# Patient Record
Sex: FEMALE | Race: WHITE | NOT HISPANIC OR LATINO | Employment: FULL TIME | URBAN - METROPOLITAN AREA
[De-identification: names, ages, dates, MRNs, and addresses within clinical notes are randomized per-mention and may not be internally consistent; named-entity substitution may affect disease eponyms.]

---

## 2017-10-30 ENCOUNTER — TELEPHONE (OUTPATIENT)
Dept: OBSTETRICS AND GYNECOLOGY | Facility: CLINIC | Age: 37
End: 2017-10-30

## 2017-10-30 ENCOUNTER — INITIAL PRENATAL (OUTPATIENT)
Dept: OBSTETRICS AND GYNECOLOGY | Facility: CLINIC | Age: 37
End: 2017-10-30

## 2017-10-30 VITALS
DIASTOLIC BLOOD PRESSURE: 62 MMHG | HEIGHT: 65 IN | SYSTOLIC BLOOD PRESSURE: 120 MMHG | BODY MASS INDEX: 31.32 KG/M2 | WEIGHT: 188 LBS

## 2017-10-30 DIAGNOSIS — O30.043 DICHORIONIC DIAMNIOTIC TWIN PREGNANCY IN THIRD TRIMESTER: Primary | ICD-10-CM

## 2017-10-30 DIAGNOSIS — O09.523 ELDERLY MULTIGRAVIDA IN THIRD TRIMESTER: ICD-10-CM

## 2017-10-30 PROCEDURE — 99204 OFFICE O/P NEW MOD 45 MIN: CPT | Performed by: OBSTETRICS & GYNECOLOGY

## 2017-10-30 RX ORDER — FERROUS SULFATE 325(65) MG
325 TABLET ORAL
Qty: 30 TABLET | Refills: 5 | Status: SHIPPED | OUTPATIENT
Start: 2017-10-30 | End: 2018-01-08

## 2017-10-30 RX ORDER — FERROUS SULFATE 325(65) MG
325 TABLET ORAL
COMMUNITY
End: 2017-11-13 | Stop reason: SDUPTHER

## 2017-10-30 NOTE — TELEPHONE ENCOUNTER
Dr Andre merino pt this morning, her iron and prenatal vitamin did not get sent to [facility] on [address]

## 2017-10-31 ENCOUNTER — TELEPHONE (OUTPATIENT)
Dept: OBSTETRICS AND GYNECOLOGY | Facility: CLINIC | Age: 37
End: 2017-10-31

## 2017-10-31 NOTE — TELEPHONE ENCOUNTER
Nurse from [facility] called - prescription were to be called to pharmacy iron was prenatal vitamins were not    Please call prenatal vitamins to [facility] [address]   She can not do over the counter - she needs some called in

## 2017-11-01 ENCOUNTER — TELEPHONE (OUTPATIENT)
Dept: OBSTETRICS AND GYNECOLOGY | Facility: CLINIC | Age: 37
End: 2017-11-01

## 2017-11-01 NOTE — TELEPHONE ENCOUNTER
Dr AMILCAR Nolasco is calling for prenatal vitamins to please be called in, she has been out for a week. She put a message in Monday and yesterday.

## 2017-11-02 ENCOUNTER — TELEPHONE (OUTPATIENT)
Dept: OBSTETRICS AND GYNECOLOGY | Facility: CLINIC | Age: 37
End: 2017-11-02

## 2017-11-02 NOTE — TELEPHONE ENCOUNTER
Dr. Powell patient 28w6d  [phone] Pharmacy is needing clarification on prenatal vitamin. Pharmacist states patient's insurance will not cover a gummy but they will cover a chewable and wants to know if it's OK for them to change Rx from a gummy to a chewable. I gave them the OK to change from a gummy to a chewable.

## 2017-11-03 ENCOUNTER — APPOINTMENT (OUTPATIENT)
Dept: WOMENS IMAGING | Facility: HOSPITAL | Age: 37
End: 2017-11-03

## 2017-11-03 ENCOUNTER — HOSPITAL ENCOUNTER (OUTPATIENT)
Facility: HOSPITAL | Age: 37
Setting detail: OBSERVATION
Discharge: HOME | End: 2017-11-03
Attending: OBSTETRICS & GYNECOLOGY | Admitting: OBSTETRICS & GYNECOLOGY

## 2017-11-03 VITALS
SYSTOLIC BLOOD PRESSURE: 123 MMHG | DIASTOLIC BLOOD PRESSURE: 67 MMHG | BODY MASS INDEX: 29.09 KG/M2 | WEIGHT: 181 LBS | TEMPERATURE: 98 F | HEIGHT: 66 IN | HEART RATE: 94 BPM | RESPIRATION RATE: 18 BRPM

## 2017-11-03 PROBLEM — O46.93 PREGNANCY WITH THIRD TRIMESTER BLEEDING: Status: ACTIVE | Noted: 2017-11-03

## 2017-11-03 LAB
ABO GROUP BLD: NORMAL
ABO GROUP BLD: NORMAL
BACTERIA UR QL AUTO: ABNORMAL /HPF
BILIRUB UR QL STRIP: NEGATIVE
BLD GP AB SCN SERPL QL: NEGATIVE
CLARITY UR: ABNORMAL
COLOR UR: YELLOW
DEPRECATED RDW RBC AUTO: 44.6 FL (ref 37–54)
ERYTHROCYTE [DISTWIDTH] IN BLOOD BY AUTOMATED COUNT: 12.9 % (ref 11.3–14.5)
GLUCOSE UR STRIP-MCNC: NEGATIVE MG/DL
HCT VFR BLD AUTO: 30 % (ref 34.5–44)
HGB BLD-MCNC: 9.9 G/DL (ref 11.5–15.5)
HGB UR QL STRIP.AUTO: NEGATIVE
HYALINE CASTS UR QL AUTO: ABNORMAL /LPF
KETONES UR QL STRIP: NEGATIVE
LEUKOCYTE ESTERASE UR QL STRIP.AUTO: NEGATIVE
MCH RBC QN AUTO: 31.5 PG (ref 27–31)
MCHC RBC AUTO-ENTMCNC: 33 G/DL (ref 32–36)
MCV RBC AUTO: 95.5 FL (ref 80–99)
NITRITE UR QL STRIP: NEGATIVE
PH UR STRIP.AUTO: 7 [PH] (ref 5–8)
PLATELET # BLD AUTO: 204 10*3/MM3 (ref 150–450)
PMV BLD AUTO: 10.5 FL (ref 6–12)
PROT UR QL STRIP: NEGATIVE
RBC # BLD AUTO: 3.14 10*6/MM3 (ref 3.89–5.14)
RBC # UR: ABNORMAL /HPF
REF LAB TEST METHOD: ABNORMAL
RH BLD: POSITIVE
RH BLD: POSITIVE
SP GR UR STRIP: 1.02 (ref 1–1.03)
SQUAMOUS #/AREA URNS HPF: ABNORMAL /HPF
UROBILINOGEN UR QL STRIP: ABNORMAL
WBC NRBC COR # BLD: 11.22 10*3/MM3 (ref 3.5–10.8)
WBC UR QL AUTO: ABNORMAL /HPF

## 2017-11-03 PROCEDURE — 76818 FETAL BIOPHYS PROFILE W/NST: CPT

## 2017-11-03 PROCEDURE — 76811 OB US DETAILED SNGL FETUS: CPT

## 2017-11-03 PROCEDURE — 86900 BLOOD TYPING SEROLOGIC ABO: CPT

## 2017-11-03 PROCEDURE — 87350 HEPATITIS BE AG IA: CPT | Performed by: OBSTETRICS & GYNECOLOGY

## 2017-11-03 PROCEDURE — 80074 ACUTE HEPATITIS PANEL: CPT | Performed by: OBSTETRICS & GYNECOLOGY

## 2017-11-03 PROCEDURE — 85027 COMPLETE CBC AUTOMATED: CPT | Performed by: OBSTETRICS & GYNECOLOGY

## 2017-11-03 PROCEDURE — 81001 URINALYSIS AUTO W/SCOPE: CPT | Performed by: OBSTETRICS & GYNECOLOGY

## 2017-11-03 PROCEDURE — 86850 RBC ANTIBODY SCREEN: CPT | Performed by: OBSTETRICS & GYNECOLOGY

## 2017-11-03 PROCEDURE — 86704 HEP B CORE ANTIBODY TOTAL: CPT | Performed by: OBSTETRICS & GYNECOLOGY

## 2017-11-03 PROCEDURE — 86901 BLOOD TYPING SEROLOGIC RH(D): CPT

## 2017-11-03 PROCEDURE — 86706 HEP B SURFACE ANTIBODY: CPT | Performed by: OBSTETRICS & GYNECOLOGY

## 2017-11-03 PROCEDURE — 99219 PR INITIAL OBSERVATION CARE/DAY 50 MINUTES: CPT | Performed by: OBSTETRICS & GYNECOLOGY

## 2017-11-03 PROCEDURE — 76812 OB US DETAILED ADDL FETUS: CPT | Performed by: OBSTETRICS & GYNECOLOGY

## 2017-11-03 PROCEDURE — 86707 HEPATITIS BE ANTIBODY: CPT | Performed by: OBSTETRICS & GYNECOLOGY

## 2017-11-03 PROCEDURE — 86901 BLOOD TYPING SEROLOGIC RH(D): CPT | Performed by: OBSTETRICS & GYNECOLOGY

## 2017-11-03 PROCEDURE — G0378 HOSPITAL OBSERVATION PER HR: HCPCS

## 2017-11-03 PROCEDURE — 59025 FETAL NON-STRESS TEST: CPT | Performed by: OBSTETRICS & GYNECOLOGY

## 2017-11-03 PROCEDURE — 76812 OB US DETAILED ADDL FETUS: CPT

## 2017-11-03 PROCEDURE — 76811 OB US DETAILED SNGL FETUS: CPT | Performed by: OBSTETRICS & GYNECOLOGY

## 2017-11-03 PROCEDURE — 86900 BLOOD TYPING SEROLOGIC ABO: CPT | Performed by: OBSTETRICS & GYNECOLOGY

## 2017-11-03 RX ORDER — ACETAMINOPHEN 325 MG/1
650 TABLET ORAL ONCE
Status: COMPLETED | OUTPATIENT
Start: 2017-11-03 | End: 2017-11-03

## 2017-11-03 RX ADMIN — ACETAMINOPHEN 650 MG: 325 TABLET ORAL at 05:32

## 2017-11-03 NOTE — NURSING NOTE
D/c teaching reviewed with patient. All questions are answered. Pt is able to v/u. D/c'd to home in good condition

## 2017-11-03 NOTE — H&P
"Viviane Brambila  1980  [id number]  [id number]    CC: bleeding  HPI:  Patient is 36 y.o. white female   currently at 29w0d  Presents with c/o waking up at 0130 with bright red blood in underwear and on bed.  No preceding pain.  Since then pt has only seen pink-tinged d/c with wiping.  Pt does c/o pelvic pressure, constant, non-radiating.  Pt denies recent intercourse.  PNC comp by di/di twins, tob use.  Pt is residing at [facility] from [place].     PMH:   Current meds PNV, Fe, Zantac 150mg prn  Illnesses none  Surgeries breast aug, oral surg  Allergies NKDA    Past OB History:       Obstetric History       T4      L4     SAB0   TAB0   Ectopic0   Multiple0   Live Births4       # Outcome Date GA Lbr Sami/2nd Weight Sex Delivery Anes PTL Lv   6 Current            5 Term 16   7 lb 3 oz (3.26 kg) F Vag-Spont   LYNDON      Name: Rai   4 Term 09   7 lb (3.175 kg) M Vag-Spont   LYNDON      Name: Arvind   3 Term 03   8 lb 6 oz (3.799 kg) F Vag-Spont   LYNDON      Name: Janiya   2 Term 01   7 lb 5 oz (3.317 kg) F Vag-Spont   LYNDON      Name: Adriane   1 AB                        SH: tob 1/2 PPD , EtOH neg, drugs prior hx of xanax use and IV meth use  FH: heart dz neg , diabetes neg , cancer pos    General ROS: All systems reviewed and negative       Physical Examination: General appearance - alert, well appearing, and in no distress  Vital signs - /74 (BP Location: Right arm, Patient Position: Lying)  Pulse 96  Temp 98 °F (36.7 °C) (Oral)   Resp 16  Ht 66\" (167.6 cm)  Wt 181 lb (82.1 kg)  LMP 2017 (Approximate)  BMI 29.21 kg/m2  HEENT: normocephalic, atraumatic, pharynx clear   Neck - supple, no significant adenopathy  Lymphatics - no palpable lymphadenopathy, no hepatosplenomegaly  Chest - clear to auscultation, no wheezes, rales or rhonchi, symmetric air entry  Heart - normal rate, regular rhythm, normal S1, S2, no murmurs, rubs, clicks or gallops, no " JVD  Abdomen - soft, nontender, nondistended, no masses or organomegaly  no rebound tenderness noted, bowel sounds normal  Vaginal Exam: 1-2/th/ballot, no blood or blood-tinged d/c in vagina  Extremities - no pedal edema noted, no calf tend  Skin - normal coloration and turgor, no rashes, no suspicious skin lesions noted        Fetal monitoring: indication vaginal bleeding , onset 0412 , offset 0450 , baseline 145(A) 140(B) , mod BTB variability(A and B) , multiple accels (10 X 10)(A and B), no decels(A or B), occas contractions, interpretation reactive NST (A and B)    Radiology US:vertex, vertex, both fetuses active with normal fluid, both placentas show no evid retroplacental clot    Assessment 1)IUP 29 0/7 weeks     2)di/di twins      3)bleeding- ?source, doubt vaginal, doubt BLAKE with rare contractions and 4 prior vaginal deliveries    4)tobacco abuse      5)hx substance use    Plan 1)observe      2)cath UA      3)PDC scan in am    4)labs    Geo Green MD  11/3/2017  4:45 AM

## 2017-11-03 NOTE — POST-PROCEDURE NOTE
MFM ultrasound    Twin A    Breech  Size=Dates = 2lb 14oz  BRUCE normal  S/D normal  BPP 8/8    Twin B  Vertex  Size=Dates = 3lb 6oz  BRUCE normal   S/D normal  BPP 8/8      No previa, abruption or subchorionic hematoma seen.  Cervix 3.7 cm

## 2017-11-06 LAB
HBV CORE AB SER DONR QL IA: NEGATIVE
HBV CORE IGM SERPL QL IA: NEGATIVE
HBV E AB SERPL QL IA: NEGATIVE
HBV E AG SERPL QL IA: NEGATIVE
HBV SURFACE AB SER QL: REACTIVE
HBV SURFACE AG SERPL QL IA: NEGATIVE
HCV AB S/CO SERPL IA: >11 S/CO RATIO (ref 0–0.9)
LABORATORY COMMENT REPORT: NORMAL

## 2017-11-13 ENCOUNTER — ROUTINE PRENATAL (OUTPATIENT)
Dept: OBSTETRICS AND GYNECOLOGY | Facility: CLINIC | Age: 37
End: 2017-11-13

## 2017-11-13 VITALS — BODY MASS INDEX: 32.18 KG/M2 | DIASTOLIC BLOOD PRESSURE: 66 MMHG | SYSTOLIC BLOOD PRESSURE: 134 MMHG | WEIGHT: 199.4 LBS

## 2017-11-13 DIAGNOSIS — O09.523 AMA (ADVANCED MATERNAL AGE) MULTIGRAVIDA 35+, THIRD TRIMESTER: ICD-10-CM

## 2017-11-13 DIAGNOSIS — O30.041 DICHORIONIC DIAMNIOTIC TWIN PREGNANCY IN FIRST TRIMESTER: Primary | ICD-10-CM

## 2017-11-13 DIAGNOSIS — F19.11 HISTORY OF SUBSTANCE ABUSE (HCC): ICD-10-CM

## 2017-11-13 PROCEDURE — 99213 OFFICE O/P EST LOW 20 MIN: CPT | Performed by: OBSTETRICS & GYNECOLOGY

## 2017-11-13 RX ORDER — PROMETHAZINE HYDROCHLORIDE 25 MG/1
TABLET ORAL
Refills: 0 | COMMUNITY
Start: 2017-11-07 | End: 2017-11-22 | Stop reason: HOSPADM

## 2017-11-13 RX ORDER — POLYETHYLENE GLYCOL 3350 17 G/17G
POWDER, FOR SOLUTION ORAL
Refills: 0 | COMMUNITY
Start: 2017-11-06 | End: 2018-01-08

## 2017-11-13 RX ORDER — VITAMINS AND MINERALS 1; 1000; 100; 400; 30; 3; 3; 15; 20; 12; 7; 200; 29; 20 MG/1; [IU]/1; MG/1; [IU]/1; [IU]/1; MG/1; MG/1; MG/1; MG/1; UG/1; MG/1; MG/1; MG/1; MG/1
TABLET, CHEWABLE ORAL
COMMUNITY
Start: 2017-11-02 | End: 2017-12-12 | Stop reason: SDUPTHER

## 2017-11-13 NOTE — PROGRESS NOTES
Chief Complaint   Patient presents with   • Routine Prenatal Visit     Pelvic pressure       HPI: Viviane is a  currently at 30w3d who today reports the following:  Contractions - No; Leaking - No; Vaginal bleeding -  No; Swelling of extremities - No.    ROS:  GI: Nausea - No; Constipation - No; Diarrhea - No    Neuro: Headache - No; Visual change - No      EXAM:  Vitals: See prenatal flowsheet   Abdomen: See prenatal flowsheet   Urine glucose/protein: See prenatal flowsheet   Pelvic: See prenatal flowsheet   MDM:  Impression: 1. Supervision of low risk pregnancy  2. H/O prior narcotics abuse - in remission on maintenance  3. Twin pregnancy - DC/DA  4. AMA   Tests done today: 1. none   Topics discussed: 1. Continue with PNV's  2. Prenatal labs reviewed  3. none - she had no major complaints,questions or concerns   Tests next visit: U/S for EFW   Next visit: See prenatal flowsheet

## 2017-11-21 ENCOUNTER — TELEPHONE (OUTPATIENT)
Dept: OBSTETRICS AND GYNECOLOGY | Facility: CLINIC | Age: 37
End: 2017-11-21

## 2017-11-22 ENCOUNTER — HOSPITAL ENCOUNTER (OUTPATIENT)
Facility: HOSPITAL | Age: 37
Discharge: HOME | End: 2017-11-22
Attending: OBSTETRICS & GYNECOLOGY | Admitting: OBSTETRICS & GYNECOLOGY

## 2017-11-22 ENCOUNTER — TELEPHONE (OUTPATIENT)
Dept: OBSTETRICS AND GYNECOLOGY | Facility: CLINIC | Age: 37
End: 2017-11-22

## 2017-11-22 VITALS
SYSTOLIC BLOOD PRESSURE: 120 MMHG | DIASTOLIC BLOOD PRESSURE: 63 MMHG | TEMPERATURE: 98.4 F | HEART RATE: 107 BPM | RESPIRATION RATE: 22 BRPM | BODY MASS INDEX: 32.78 KG/M2 | WEIGHT: 204 LBS | HEIGHT: 66 IN

## 2017-11-22 PROCEDURE — G0463 HOSPITAL OUTPT CLINIC VISIT: HCPCS

## 2017-11-22 PROCEDURE — 59025 FETAL NON-STRESS TEST: CPT

## 2017-11-22 PROCEDURE — 99225 PR SBSQ OBSERVATION CARE/DAY 25 MINUTES: CPT | Performed by: OBSTETRICS & GYNECOLOGY

## 2017-11-22 RX ORDER — HYDROXYZINE PAMOATE 25 MG/1
CAPSULE ORAL
Qty: 30 CAPSULE | Refills: 1 | Status: SHIPPED | OUTPATIENT
Start: 2017-11-22 | End: 2018-01-08

## 2017-11-22 RX ORDER — RANITIDINE 150 MG/1
150 TABLET ORAL NIGHTLY
Qty: 30 TABLET | Refills: 2 | Status: SHIPPED | OUTPATIENT
Start: 2017-11-22 | End: 2018-01-08

## 2017-11-22 RX ORDER — RANITIDINE 150 MG/1
150 TABLET ORAL NIGHTLY
Qty: 30 TABLET | Refills: 1 | Status: ON HOLD | OUTPATIENT
Start: 2017-11-22 | End: 2017-11-22

## 2017-11-22 NOTE — NURSING NOTE
1707--DISCHARGE TEACHING DONE, INSTRUCTED TO KEEP SCHEDULED APPOINTMENT, RX SENT TO PT'S PHARMACY, V/U  2729--DISCHARGED VIA AMBULATION TO CAR ACCOMPANIED BY FRIEND

## 2017-11-22 NOTE — TELEPHONE ENCOUNTER
Dr Powell Pt is calling stating she is having a lot pain between her legs and pressure. She believes she has lost her mucus plug last week.   Call her at this [phone]

## 2017-11-22 NOTE — PROGRESS NOTES
Atiya Brambila  : 1980  MRN: [id number]  CSN: [id number]    Antepartum Progress Note      Subjective     Patient complained of a sharp pelvic pain in the tailbone radiating into the vagina.  Worse when sitting or laying for extended periods time.  Normal fetal movements, negative contractions, leakage of fluid and vaginal bleeding.    Objective     Min/max vitals past 24 hours:   Temp  Min: 98.4 °F (36.9 °C)  Max: 98.4 °F (36.9 °C)  BP  Min: 120/63  Max: 120/63  Pulse  Min: 107  Max: 107  Resp  Min: 22  Max: 22         General: well developed; well nourished  no acute distress   Heart: regular rate and rhythm, S1, S2 normal, no murmur, click, rub or gallop   Lungs: breathing is unlabored   Abdomen: soft, non-tender; no masses  no umbilical or inginual hernias are present  no hepato-splenomegaly  fundus firm and non-tender   FHT's: reassuring and Appropriate for gestational age ×2   Cervix: was checked (by RN): 1 cm / 10 % / Floating   Contractions: irregular   Back: bilateral CVA tenderness is absent           Assessment     1. IUP at 31w5d  2. Pelvic pain       Plan     1. Explain to patient that pain is most likely due to softening of the joints in the sacrum.  Patient reassured.  Will discharge home.    Miguelito Powell MD  2017  4:51 PM

## 2017-11-28 ENCOUNTER — ROUTINE PRENATAL (OUTPATIENT)
Dept: OBSTETRICS AND GYNECOLOGY | Facility: CLINIC | Age: 37
End: 2017-11-28

## 2017-11-28 VITALS — BODY MASS INDEX: 33.41 KG/M2 | WEIGHT: 207 LBS | DIASTOLIC BLOOD PRESSURE: 68 MMHG | SYSTOLIC BLOOD PRESSURE: 120 MMHG

## 2017-11-28 DIAGNOSIS — O30.043 DICHORIONIC DIAMNIOTIC TWIN PREGNANCY IN THIRD TRIMESTER: Primary | ICD-10-CM

## 2017-11-28 PROCEDURE — 99213 OFFICE O/P EST LOW 20 MIN: CPT | Performed by: OBSTETRICS & GYNECOLOGY

## 2017-11-28 NOTE — PROGRESS NOTES
Chief Complaint   Patient presents with   • Routine Prenatal Visit     No complaints       HPI: Viviane is a  currently at 32w4d who today reports the following:  Contractions - No; Leaking - No; Vaginal bleeding -  No; Swelling of extremities - No.    ROS:  GI: Nausea - No; Constipation - No; Diarrhea - No    Neuro: Headache - No; Visual change - No      EXAM:  Vitals: See prenatal flowsheet   Abdomen: See prenatal flowsheet   Urine glucose/protein: See prenatal flowsheet   Pelvic: See prenatal flowsheet   MDM:  Impression: 1. Supervision of low risk pregnancy  2. Twin pregnancy - DC/DA   Tests done today: 1. U/S for EFW - Report pending   Topics discussed: 1. Continue with PNV's  2. Prenatal labs reviewed  3. none - she had no major complaints,questions or concerns   Tests next visit: none   Next visit: See prenatal flowsheet

## 2017-12-07 ENCOUNTER — HOSPITAL ENCOUNTER (OUTPATIENT)
Facility: HOSPITAL | Age: 37
Setting detail: OBSERVATION
Discharge: HOME | End: 2017-12-07
Attending: OBSTETRICS & GYNECOLOGY | Admitting: OBSTETRICS & GYNECOLOGY

## 2017-12-07 ENCOUNTER — TELEPHONE (OUTPATIENT)
Dept: OBSTETRICS AND GYNECOLOGY | Facility: CLINIC | Age: 37
End: 2017-12-07

## 2017-12-07 VITALS
HEIGHT: 65 IN | SYSTOLIC BLOOD PRESSURE: 129 MMHG | DIASTOLIC BLOOD PRESSURE: 70 MMHG | RESPIRATION RATE: 16 BRPM | BODY MASS INDEX: 34.32 KG/M2 | TEMPERATURE: 97.8 F | HEART RATE: 101 BPM | WEIGHT: 206 LBS

## 2017-12-07 PROBLEM — Z34.90 PREGNANCY: Status: ACTIVE | Noted: 2017-12-07

## 2017-12-07 LAB
BACTERIA UR QL AUTO: ABNORMAL /HPF
BILIRUB UR QL STRIP: NEGATIVE
CLARITY UR: ABNORMAL
COLOR UR: YELLOW
GLUCOSE UR STRIP-MCNC: NEGATIVE MG/DL
HGB UR QL STRIP.AUTO: NEGATIVE
HYALINE CASTS UR QL AUTO: ABNORMAL /LPF
KETONES UR QL STRIP: NEGATIVE
LEUKOCYTE ESTERASE UR QL STRIP.AUTO: NEGATIVE
NITRITE UR QL STRIP: NEGATIVE
PH UR STRIP.AUTO: 7 [PH] (ref 5–8)
PROT UR QL STRIP: ABNORMAL
RBC # UR: ABNORMAL /HPF
REF LAB TEST METHOD: ABNORMAL
SP GR UR STRIP: 1.02 (ref 1–1.03)
SQUAMOUS #/AREA URNS HPF: ABNORMAL /HPF
UROBILINOGEN UR QL STRIP: ABNORMAL
WBC UR QL AUTO: ABNORMAL /HPF

## 2017-12-07 PROCEDURE — 99218 PR INITIAL OBSERVATION CARE/DAY 30 MINUTES: CPT | Performed by: OBSTETRICS & GYNECOLOGY

## 2017-12-07 PROCEDURE — G0378 HOSPITAL OBSERVATION PER HR: HCPCS

## 2017-12-07 PROCEDURE — 81001 URINALYSIS AUTO W/SCOPE: CPT | Performed by: OBSTETRICS & GYNECOLOGY

## 2017-12-07 PROCEDURE — 59025 FETAL NON-STRESS TEST: CPT

## 2017-12-07 PROCEDURE — 87086 URINE CULTURE/COLONY COUNT: CPT | Performed by: OBSTETRICS & GYNECOLOGY

## 2017-12-07 NOTE — PROGRESS NOTES
[facility]  Obstetric History and Physical    Chief Complaint   Patient presents with   • Abdominal Pain     Patient states she felt a gush of fluid 17 and is having left sided pain that radiates to her back       Subjective     Patient is a 37 y.o. female  currently at 33w6d, who presents with C/O rupture membranes.  Plan possible leaking of fluid last evening without associated vaginal bleeding or regular contractions.  Patient reports normal fetal activity with both fetuses will care, K by advanced maternal age DI/DI  twin gestation.    Her prenatal care is complicated by  .  Her previous obstetric/gynecological history is noted for is remarkable for .    The following portions of the patients history were reviewed and updated as appropriate: current medications, allergies, past medical history, past surgical history, past family history, past social history and problem list .       Prenatal Information:   Maternal Prenatal Labs  Blood Type No results found for: ABO   Rh Status No results found for: RH   Antibody Screen No results found for: ABSCRN   Gonnorhea No results found for: GCCX   Chlamydia No results found for: CLAMYDCU   RPR No results found for: RPR   Syphilis Antibody No results found for: SYPHILIS   Rubella No results found for: RUBELLAIGGIN   Hepatitis B Surface Antigen No results found for: HEPBSAG   HIV-1 Antibody No results found for: LABHIV1   Hepatitis C Antibody No results found for: HEPCAB   Rapid Urin Drug Screen No results found for: AMPMETHU, BARBITSCNUR, LABBENZSCN, LABMETHSCN, LABOPIASCN, THCURSCR, COCAINEUR, AMPHETSCREEN, PROPOXSCN, BUPRENORSCNU, METAMPSCNUR, OXYCODONESCN, TRICYCLICSCN   Group B Strep Culture No results found for: GBSANTIGEN                 Past OB History:       Obstetric History       T4      L4     SAB0   TAB0   Ectopic0   Multiple0   Live Births4       # Outcome Date GA Lbr Sami/2nd Weight Sex Delivery Anes PTL Lv   6 Current            5  Term 16   3260 g (7 lb 3 oz) F Vag-Spont   LYNDON      Name: Rai   4 Term 09   3175 g (7 lb) M Vag-Spont   LYNDON      Name: Arvind   3 Term 03   3799 g (8 lb 6 oz) F Vag-Spont   LYNDON      Name: Janiya   2 Term 01   3317 g (7 lb 5 oz) F Vag-Spont   LYNDON      Name: Adriane   1 AB                   Past Medical History: Past Medical History:   Diagnosis Date   •  in first trimester 2012    ELECTIVE   • TIA (transient ischemic attack)       Past Surgical History Past Surgical History:   Procedure Laterality Date   • BREAST AUGMENTATION Bilateral    • INDUCED   2012    ELECTIVE   • WISDOM TOOTH EXTRACTION        Family History: Family History   Problem Relation Age of Onset   • Colon cancer Maternal Grandmother       Social History:  reports that she has been smoking Cigarettes.  She has been smoking about 0.50 packs per day. She has never used smokeless tobacco.   reports that she does not drink alcohol.   reports that she uses illicit drugs, including Methamphetamines and Benzodiazepines.                   General ROS Negative Findings:Headaches, Visual Changes, Epigastric pain, Anorexia, Nausia/Vomiting and Vaginal Bleeding    ROS      Objective       Vital Signs Range for the last 24 hours  Temperature: Temp:  [97.8 °F (36.6 °C)] 97.8 °F (36.6 °C)   Temp Source: Temp src: Oral   BP: BP: (129)/(70) 129/70   Pulse: Heart Rate:  [101] 101   Respirations: Resp:  [16] 16   SPO2:     O2 Amount (l/min):     O2 Devices     Weight: Weight:  [93.4 kg (206 lb)] 93.4 kg (206 lb)     Physical Examination:   General:   alert, appears stated age and cooperative   Skin:   normal   HEENT:     Lungs:   clear to auscultation bilaterally   Heart:   regular rate and rhythm, S1, S2 normal, no murmur, click, rub or gallop   Abdomen:  soft, gravid uterus non tender guarding, no rebound, negative CVA tenderness.     Lower Extremeties No edema, no calf tennis, DTRs 2+ over 4 no clonus    Pelvis:  External  genitalia: normal general appearance  Uterus: enlarged     Sterile speculum exam-no pooling, negative ferning, cervix is closed no evidence of blood.    Presentation:    Cervix: Exam by: Method: sterile exam per physician   Dilation: Dilation: 0   Effacement: Cervical Effacement: 60%   Station: Station: -2       Fetal Heart Rate Assessment   Method:     Beats/min:     Baseline:     Varibility:     Accels:     Decels:     Tracing Category:       Uterine Assessment   Method:     Frequency (min):     Ctx Count in 10 min:     Duration:     Intensity:     Intensity by IUPC:     Resting Tone:     Resting Tone by IUPC:     [place] Units:       Laboratory Results: @srugudgv45@  Radiology Review:@lastrad@  Other Studies:    Assessment/Plan     Active Problems:    Dichorionic diamniotic twin pregnancy in third trimester    Pregnancy        Assessment:  1.  Intrauterine pregnancy at 33w6d  Twin weeks gestation with reactive fetal status.    2. No evidence of rupture or labor .  3.   4.      Plan:  1. D/C to home, F/U OB routine or prn, PTL instructions.  2. Plan of care has been reviewed with patient.  3.  Risks, benefits of treatment plan have been discussed.  4.  All questions have been answered.  5      Andre Denney,   12/7/2017  12:31 PM

## 2017-12-07 NOTE — NURSING NOTE
Pt in stable condition, voices no complaints at this time. Discharge instructions reviewed with pt JANY. Pt has no questions at this time. DC'D home via private vehicle with a friend.

## 2017-12-07 NOTE — DISCHARGE INSTR - LAB
Keep your next scheduled appointment on Dec 12th with Dr Powell. If you have any concerns before your appointment please notify your provider.

## 2017-12-07 NOTE — TELEPHONE ENCOUNTER
Dr Powell Patient says her water broke yesterday and since then she is having pain and her belly feels tight.Please call this # [phone] she is out of minutes on her phone. Pregnant with twins.

## 2017-12-08 ENCOUNTER — TELEPHONE (OUTPATIENT)
Dept: OBSTETRICS AND GYNECOLOGY | Facility: CLINIC | Age: 37
End: 2017-12-08

## 2017-12-08 NOTE — TELEPHONE ENCOUNTER
Dr Powell Patient is stating she is in pain more then she can stand, she can't walk, she is 3 centimeters. Was in the labor and delivery yesterday.  Would like her Dr to please call her. [phone], wants you to please call her on this number.

## 2017-12-08 NOTE — TELEPHONE ENCOUNTER
I spoke with Dr. Powell, who states that the patient is carrying 2 large babies, and she probably is going to be uncomfortable until she delivers.  She was advised to rest as much as she can.  She is living at the [facility], and if she doesn't deliver by next Tuesday, she will have an appointment with Dr. Powell and will ask for a letter that will allow her to rest more/be on light duty.

## 2017-12-08 NOTE — TELEPHONE ENCOUNTER
"I called Viviane, who is weeks pregnant with twins.  She states that she \"feels like she is dying\" and \"has never been in this much pain\".  Having extreme pressure on \"pelvic bone and tail bone\".  States that she is having some contractions, but they are 20 minutes apart.  Unable to get comfortable, \"unable to rest\".  No abnormal discharge.  She would like for Dr. Powell to call her at the number listed in previous note.   "

## 2017-12-09 LAB
BACTERIA SPEC AEROBE CULT: NORMAL
BACTERIA SPEC AEROBE CULT: NORMAL

## 2017-12-12 ENCOUNTER — HOSPITAL ENCOUNTER (OUTPATIENT)
Facility: HOSPITAL | Age: 37
Discharge: HOME | End: 2017-12-12
Attending: OBSTETRICS & GYNECOLOGY | Admitting: OBSTETRICS & GYNECOLOGY

## 2017-12-12 ENCOUNTER — ROUTINE PRENATAL (OUTPATIENT)
Dept: OBSTETRICS AND GYNECOLOGY | Facility: CLINIC | Age: 37
End: 2017-12-12

## 2017-12-12 VITALS
HEART RATE: 110 BPM | DIASTOLIC BLOOD PRESSURE: 59 MMHG | TEMPERATURE: 98.4 F | SYSTOLIC BLOOD PRESSURE: 125 MMHG | RESPIRATION RATE: 18 BRPM

## 2017-12-12 VITALS — BODY MASS INDEX: 35.15 KG/M2 | SYSTOLIC BLOOD PRESSURE: 110 MMHG | DIASTOLIC BLOOD PRESSURE: 68 MMHG | WEIGHT: 211.2 LBS

## 2017-12-12 DIAGNOSIS — O30.043 DICHORIONIC DIAMNIOTIC TWIN PREGNANCY IN THIRD TRIMESTER: Primary | ICD-10-CM

## 2017-12-12 PROCEDURE — G0463 HOSPITAL OUTPT CLINIC VISIT: HCPCS

## 2017-12-12 PROCEDURE — 99213 OFFICE O/P EST LOW 20 MIN: CPT | Performed by: OBSTETRICS & GYNECOLOGY

## 2017-12-12 RX ORDER — LIDOCAINE HYDROCHLORIDE 10 MG/ML
5 INJECTION, SOLUTION INFILTRATION; PERINEURAL AS NEEDED
Status: CANCELLED | OUTPATIENT
Start: 2017-12-12

## 2017-12-12 RX ORDER — MISOPROSTOL 100 UG/1
800 TABLET ORAL AS NEEDED
Status: CANCELLED | OUTPATIENT
Start: 2017-12-12

## 2017-12-12 RX ORDER — VITAMIN A ACETATE, .BETA.-CAROTENE, ASCORBIC ACID, CHOLECALCIFEROL, .ALPHA.-TOCOPHEROL ACETATE, DL-, THIAMINE MONONITRATE, RIBOFLAVIN, NIACINAMIDE, PYRIDOXINE HYDROCHLORIDE, FOLIC ACID, CYANOCOBALAMIN, CALCIUM CARBONATE, FERROUS FUMARATE, ZINC OXIDE, AND CUPRIC OXIDE 2000; 2000; 120; 400; 22; 1.84; 3; 20; 10; 1; 12; 200; 27; 25; 2 [IU]/1; [IU]/1; MG/1; [IU]/1; MG/1; MG/1; MG/1; MG/1; MG/1; MG/1; UG/1; MG/1; MG/1; MG/1; MG/1
TABLET ORAL
Refills: 1 | COMMUNITY
Start: 2017-12-06 | End: 2019-05-10

## 2017-12-12 RX ORDER — ACETAMINOPHEN 325 MG/1
650 TABLET ORAL EVERY 4 HOURS PRN
Status: CANCELLED | OUTPATIENT
Start: 2017-12-12

## 2017-12-12 RX ORDER — METHYLERGONOVINE MALEATE 0.2 MG/ML
200 INJECTION INTRAVENOUS ONCE AS NEEDED
Status: CANCELLED | OUTPATIENT
Start: 2017-12-12

## 2017-12-12 RX ORDER — ACETAMINOPHEN 500 MG
1000 TABLET ORAL EVERY 6 HOURS PRN
COMMUNITY
End: 2017-12-22 | Stop reason: HOSPADM

## 2017-12-12 RX ORDER — SODIUM CHLORIDE 0.9 % (FLUSH) 0.9 %
1-10 SYRINGE (ML) INJECTION AS NEEDED
Status: CANCELLED | OUTPATIENT
Start: 2017-12-12

## 2017-12-12 RX ORDER — CARBOPROST TROMETHAMINE 250 UG/ML
250 INJECTION, SOLUTION INTRAMUSCULAR AS NEEDED
Status: CANCELLED | OUTPATIENT
Start: 2017-12-12

## 2017-12-12 RX ADMIN — HYDROCODONE POLISTIREX AND CHLORPHENIRAMINE POLISTIREX 5 ML: 10; 8 SUSPENSION, EXTENDED RELEASE ORAL at 14:01

## 2017-12-12 NOTE — PROGRESS NOTES
Inadvertently admitted for induction of labor.  Discussed mistake with patient and patient voiced her understanding. Will be sent home with labor instructions.

## 2017-12-12 NOTE — PROGRESS NOTES
Chief Complaint   Patient presents with   • Routine Prenatal Visit     Sore throat       HPI: Viviane is a  currently at 34w4d who today reports the following:  Contractions - No; Leaking - No; Vaginal bleeding -  No; Swelling of extremities - No.    ROS:  GI: Nausea - No; Constipation - No; Diarrhea - No    Neuro: Headache - No; Visual change - No      EXAM:  Vitals: See prenatal flowsheet   Abdomen: See prenatal flowsheet   Urine glucose/protein: See prenatal flowsheet   Pelvic: See prenatal flowsheet   MDM:  Impression: 1. Supervision of low risk pregnancy   Tests done today: 1. none   Topics discussed: 1. Continue with PNV's  2. Prenatal labs reviewed  3. none - she had no major complaints,questions or concerns   Tests next visit: none   Next visit: See prenatal flowsheet

## 2017-12-12 NOTE — DISCHARGE INSTRUCTIONS
Pt received Tussenex at 1401on 12/12/17.  Any questions, please call Labor and Delivery [phone]

## 2017-12-17 ENCOUNTER — APPOINTMENT (OUTPATIENT)
Dept: GENERAL RADIOLOGY | Facility: HOSPITAL | Age: 37
End: 2017-12-17

## 2017-12-17 ENCOUNTER — HOSPITAL ENCOUNTER (INPATIENT)
Facility: HOSPITAL | Age: 37
LOS: 5 days | Discharge: HOME | End: 2017-12-22
Attending: OBSTETRICS & GYNECOLOGY | Admitting: OBSTETRICS & GYNECOLOGY

## 2017-12-17 DIAGNOSIS — O30.043 DICHORIONIC DIAMNIOTIC TWIN PREGNANCY IN THIRD TRIMESTER: ICD-10-CM

## 2017-12-17 DIAGNOSIS — J98.8 RESPIRATORY TRACT INFECTION: Primary | ICD-10-CM

## 2017-12-17 DIAGNOSIS — B00.9 HSV INFECTION: ICD-10-CM

## 2017-12-17 DIAGNOSIS — Z72.0 TOBACCO ABUSE: ICD-10-CM

## 2017-12-17 PROBLEM — O30.003 TWIN GESTATION IN THIRD TRIMESTER: Status: ACTIVE | Noted: 2017-12-17

## 2017-12-17 PROBLEM — J22 ACUTE RESPIRATORY INFECTION: Status: ACTIVE | Noted: 2017-12-17

## 2017-12-17 LAB
ALBUMIN SERPL-MCNC: 3.9 G/DL (ref 3.2–4.8)
ALBUMIN/GLOB SERPL: 1.4 G/DL (ref 1.5–2.5)
ALP SERPL-CCNC: 187 U/L (ref 25–100)
ALT SERPL W P-5'-P-CCNC: 15 U/L (ref 7–40)
AMPHET+METHAMPHET UR QL: NEGATIVE
AMPHETAMINES UR QL: NEGATIVE
ANION GAP SERPL CALCULATED.3IONS-SCNC: 13 MMOL/L (ref 3–11)
AST SERPL-CCNC: 23 U/L (ref 0–33)
BACTERIA UR QL AUTO: ABNORMAL /HPF
BARBITURATES UR QL SCN: NEGATIVE
BASOPHILS # BLD AUTO: 0.02 10*3/MM3 (ref 0–0.2)
BASOPHILS NFR BLD AUTO: 0.1 % (ref 0–1)
BENZODIAZ UR QL SCN: NEGATIVE
BILIRUB SERPL-MCNC: 0.4 MG/DL (ref 0.3–1.2)
BILIRUB UR QL STRIP: NEGATIVE
BNP SERPL-MCNC: 15 PG/ML (ref 0–100)
BUN BLD-MCNC: 7 MG/DL (ref 9–23)
BUN/CREAT SERPL: 14 (ref 7–25)
BUPRENORPHINE SERPL-MCNC: NEGATIVE NG/ML
CALCIUM SPEC-SCNC: 8.7 MG/DL (ref 8.7–10.4)
CANNABINOIDS SERPL QL: NEGATIVE
CHLORIDE SERPL-SCNC: 102 MMOL/L (ref 99–109)
CLARITY UR: CLEAR
CO2 SERPL-SCNC: 18 MMOL/L (ref 20–31)
COCAINE UR QL: NEGATIVE
COLOR UR: YELLOW
CREAT BLD-MCNC: 0.5 MG/DL (ref 0.6–1.3)
D-LACTATE SERPL-SCNC: 1.6 MMOL/L (ref 0.5–2)
DEPRECATED RDW RBC AUTO: 48.6 FL (ref 37–54)
EOSINOPHIL # BLD AUTO: 0.02 10*3/MM3 (ref 0–0.3)
EOSINOPHIL NFR BLD AUTO: 0.1 % (ref 0–3)
ERYTHROCYTE [DISTWIDTH] IN BLOOD BY AUTOMATED COUNT: 13.9 % (ref 11.3–14.5)
FLUAV SUBTYP SPEC NAA+PROBE: NOT DETECTED
FLUBV RNA ISLT QL NAA+PROBE: NOT DETECTED
GFR SERPL CREATININE-BSD FRML MDRD: 140 ML/MIN/1.73
GLOBULIN UR ELPH-MCNC: 2.7 GM/DL
GLUCOSE BLD-MCNC: 83 MG/DL (ref 70–100)
GLUCOSE UR STRIP-MCNC: NEGATIVE MG/DL
HCT VFR BLD AUTO: 34.6 % (ref 34.5–44)
HGB BLD-MCNC: 11.8 G/DL (ref 11.5–15.5)
HGB UR QL STRIP.AUTO: ABNORMAL
HYALINE CASTS UR QL AUTO: ABNORMAL /LPF
IMM GRANULOCYTES # BLD: 0.14 10*3/MM3 (ref 0–0.03)
IMM GRANULOCYTES NFR BLD: 0.7 % (ref 0–0.6)
KETONES UR QL STRIP: NEGATIVE
LEUKOCYTE ESTERASE UR QL STRIP.AUTO: ABNORMAL
LYMPHOCYTES # BLD AUTO: 0.88 10*3/MM3 (ref 0.6–4.8)
LYMPHOCYTES NFR BLD AUTO: 4.4 % (ref 24–44)
MCH RBC QN AUTO: 32.5 PG (ref 27–31)
MCHC RBC AUTO-ENTMCNC: 34.1 G/DL (ref 32–36)
MCV RBC AUTO: 95.3 FL (ref 80–99)
METHADONE UR QL SCN: NEGATIVE
MONOCYTES # BLD AUTO: 0.93 10*3/MM3 (ref 0–1)
MONOCYTES NFR BLD AUTO: 4.7 % (ref 0–12)
NEUTROPHILS # BLD AUTO: 18.01 10*3/MM3 (ref 1.5–8.3)
NEUTROPHILS NFR BLD AUTO: 90 % (ref 41–71)
NITRITE UR QL STRIP: NEGATIVE
OPIATES UR QL: NEGATIVE
OXYCODONE UR QL SCN: NEGATIVE
PCP UR QL SCN: NEGATIVE
PH UR STRIP.AUTO: 6.5 [PH] (ref 5–8)
PLATELET # BLD AUTO: 151 10*3/MM3 (ref 150–450)
PMV BLD AUTO: 11.2 FL (ref 6–12)
POTASSIUM BLD-SCNC: 3.1 MMOL/L (ref 3.5–5.5)
PROPOXYPH UR QL: NEGATIVE
PROT SERPL-MCNC: 6.6 G/DL (ref 5.7–8.2)
PROT UR QL STRIP: ABNORMAL
RBC # BLD AUTO: 3.63 10*6/MM3 (ref 3.89–5.14)
RBC # UR: ABNORMAL /HPF
REF LAB TEST METHOD: ABNORMAL
SODIUM BLD-SCNC: 133 MMOL/L (ref 132–146)
SP GR UR STRIP: 1.01 (ref 1–1.03)
SQUAMOUS #/AREA URNS HPF: ABNORMAL /HPF
TRICYCLICS UR QL SCN: NEGATIVE
UROBILINOGEN UR QL STRIP: ABNORMAL
WBC NRBC COR # BLD: 20 10*3/MM3 (ref 3.5–10.8)
WBC UR QL AUTO: ABNORMAL /HPF

## 2017-12-17 PROCEDURE — 25010000002 AZITHROMYCIN: Performed by: OBSTETRICS & GYNECOLOGY

## 2017-12-17 PROCEDURE — 94799 UNLISTED PULMONARY SVC/PX: CPT

## 2017-12-17 PROCEDURE — 83605 ASSAY OF LACTIC ACID: CPT | Performed by: OBSTETRICS & GYNECOLOGY

## 2017-12-17 PROCEDURE — 85025 COMPLETE CBC W/AUTO DIFF WBC: CPT | Performed by: OBSTETRICS & GYNECOLOGY

## 2017-12-17 PROCEDURE — 25010000003 CEFTRIAXONE PER 250 MG: Performed by: OBSTETRICS & GYNECOLOGY

## 2017-12-17 PROCEDURE — 71010 HC CHEST PA OR AP: CPT

## 2017-12-17 PROCEDURE — G0378 HOSPITAL OBSERVATION PER HR: HCPCS

## 2017-12-17 PROCEDURE — 81001 URINALYSIS AUTO W/SCOPE: CPT | Performed by: OBSTETRICS & GYNECOLOGY

## 2017-12-17 PROCEDURE — 99221 1ST HOSP IP/OBS SF/LOW 40: CPT | Performed by: OBSTETRICS & GYNECOLOGY

## 2017-12-17 PROCEDURE — 94760 N-INVAS EAR/PLS OXIMETRY 1: CPT

## 2017-12-17 PROCEDURE — 87502 INFLUENZA DNA AMP PROBE: CPT | Performed by: OBSTETRICS & GYNECOLOGY

## 2017-12-17 PROCEDURE — 80053 COMPREHEN METABOLIC PANEL: CPT | Performed by: OBSTETRICS & GYNECOLOGY

## 2017-12-17 PROCEDURE — 83880 ASSAY OF NATRIURETIC PEPTIDE: CPT | Performed by: OBSTETRICS & GYNECOLOGY

## 2017-12-17 PROCEDURE — 94640 AIRWAY INHALATION TREATMENT: CPT

## 2017-12-17 PROCEDURE — 59025 FETAL NON-STRESS TEST: CPT

## 2017-12-17 PROCEDURE — 80306 DRUG TEST PRSMV INSTRMNT: CPT | Performed by: OBSTETRICS & GYNECOLOGY

## 2017-12-17 PROCEDURE — 87086 URINE CULTURE/COLONY COUNT: CPT | Performed by: OBSTETRICS & GYNECOLOGY

## 2017-12-17 RX ORDER — SODIUM CHLORIDE 9 MG/ML
50 INJECTION, SOLUTION INTRAVENOUS CONTINUOUS
Status: DISCONTINUED | OUTPATIENT
Start: 2017-12-17 | End: 2017-12-19

## 2017-12-17 RX ORDER — ACETAMINOPHEN 325 MG/1
650 TABLET ORAL EVERY 6 HOURS PRN
Status: DISCONTINUED | OUTPATIENT
Start: 2017-12-17 | End: 2017-12-19 | Stop reason: SDUPTHER

## 2017-12-17 RX ORDER — OSELTAMIVIR PHOSPHATE 75 MG/1
75 CAPSULE ORAL EVERY 12 HOURS
Status: DISCONTINUED | OUTPATIENT
Start: 2017-12-17 | End: 2017-12-18

## 2017-12-17 RX ORDER — CEFTRIAXONE SODIUM 2 G/50ML
2 INJECTION, SOLUTION INTRAVENOUS EVERY 24 HOURS
Status: COMPLETED | OUTPATIENT
Start: 2017-12-17 | End: 2017-12-17

## 2017-12-17 RX ADMIN — ALBUTEROL SULFATE 2.5 MG: 2.5 SOLUTION RESPIRATORY (INHALATION) at 16:07

## 2017-12-17 RX ADMIN — CEFTRIAXONE SODIUM 2 G: 2 INJECTION, SOLUTION INTRAVENOUS at 17:38

## 2017-12-17 RX ADMIN — ALBUTEROL SULFATE 2.5 MG: 2.5 SOLUTION RESPIRATORY (INHALATION) at 23:21

## 2017-12-17 RX ADMIN — SODIUM CHLORIDE 50 ML/HR: 9 INJECTION, SOLUTION INTRAVENOUS at 17:37

## 2017-12-17 RX ADMIN — AZITHROMYCIN 500 MG: 500 INJECTION, POWDER, LYOPHILIZED, FOR SOLUTION INTRAVENOUS at 15:59

## 2017-12-17 RX ADMIN — ALBUTEROL SULFATE 2.5 MG: 2.5 SOLUTION RESPIRATORY (INHALATION) at 19:04

## 2017-12-17 RX ADMIN — ACETAMINOPHEN 650 MG: 325 TABLET, FILM COATED ORAL at 16:21

## 2017-12-17 RX ADMIN — OSELTAMIVIR PHOSPHATE 75 MG: 75 CAPSULE ORAL at 17:37

## 2017-12-17 RX ADMIN — SODIUM CHLORIDE 500 ML: 9 INJECTION, SOLUTION INTRAVENOUS at 17:12

## 2017-12-17 NOTE — PLAN OF CARE
Problem: Pregnancy, Multiple Gestation (Adult,Obstetrics,Pediatric)  Intervention: Promote/Monitor Maternal/Fetal Well-being    12/17/17 1830   Positioning   Positioning high Fowlers   Promote Fetal Well-being   Fetal Movement active   Fetal HR Assessment Method external   Promote/Monitor Maternal/Fetal Well-being   Method TOCO (external toco transducer)   Contraction Intensity mild by palpation   Uterine Resting Tone soft by palpation       Intervention: Normalize/Validate Psychosocial Response to Multiple Gestation Pregnancy    12/17/17 1830   Coping Strategies   Supportive Measures active listening utilized;decision-making supported       Intervention: Optimize Maternal Nutritional Intake    12/17/17 1830   Nutrition Interventions   Oral Nutrition Promotion (clear liq r/t nausea/vomiting)         Goal: Signs and Symptoms of Listed Potential Problems Will be Absent or Manageable (Pregnancy, Multiple Gestation)  Outcome: Ongoing (interventions implemented as appropriate)    12/17/17 1830   Pregnancy, Multiple Gestation   Problems Assessed (Multiple Gestation Pregnancy) all   Problems Present (Multiple Gestation Pregnancy) (resp. illness)

## 2017-12-17 NOTE — H&P
[facility]  Obstetric History and Physical    No chief complaint on file.      Subjective     Patient is a 37 y.o. female  currently at 35w2d, who presents with shortness of breath, feeling poorly, nausea and vomiting, and fever.    Her prenatal care is complicated by  substance abuse  last drug use 3/22/17 per patient.  Her previous obstetric/gynecological history is noted for four term vaginal deliveries.  Patient is Hep B Ab positive and Hep C Ab positive.    The following portions of the patients history were reviewed and updated as appropriate: current medications, allergies, past medical history, past surgical history, past family history, past social history and problem list .       Prenatal Information:  Prenatal Results         Initial Prenatal Labs Ref. Range Date Time   Hemoglobin       Hematocrit       Platelets  151 10*3/mm3 150 - 450 10*3/mm3 17 1525   Rubella IgG       Hepatitis B SAg  Negative  Negative 17 0509   Hepatitis C Ab       RPR       ABO  A   17 0909   Rh  Positive   17 0909   Antibody Screen       HIV       Urine Culture  70,000-80,000 CFU/mL Normal Urogenital Radha   17 1055   Gonorrhea       Chlamydia       TSH       2nd and 3rd Trimester Ref. Range Date Time   Hemoglobin (repeated)  11.8 g/dL 11.5 - 15.5 g/dL 17 1525   Hematocrit (repeated)  34.6 % 34.5 - 44.0 % 17 1525   GCT       Antibody Screen (repeated)  Negative   17 0909   GTT Fasting       GTT 1 Hr       GTT 2 Hr       GTT 3 Hr       Group B Strep       Drug Screening Ref. Range Date Time   Amphetamine Screen  Negative  Negative 17 1543   Barbiturate Screen  Negative  Negative 17 1543   Benzodiazepine Screen  Negative  Negative 17 1543   Methadone Screen  Negative  Negative 17 1543   Phencyclidine Screen  Negative  Negative 17 1543   Opiates Screen  Negative  Negative 17 1543   THC Screen  Negative  Negative 17 1543   Cocaine  Screen  Negative  Negative 17 1543   Propoxyphene Screen  Negative  Negative 17 1543   Buprenorphine Screen  Negative  Negative 17 1543   Methamphetamine Screen  Negative  Negative 17 1543   Oxycodone Screen  Negative  Negative 17 1543   Tryicyclic Antidepressants Screen  Negative  Negative 17 1543   Other (Risk screening) Ref. Range Date Time   Varicella IgG       Parvovirus IgG       CMV IgG       Cystic Fibrosis       Hemoglobin electrophoresis       NIPT       MSAFP-4       AFP (for NTD only)              Legend: ^: Historical            View all results for this pregnancy             Past OB History:     Obstetric History       T4      L4     SAB0   TAB0   Ectopic0   Multiple0   Live Births4       # Outcome Date GA Lbr Sami/2nd Weight Sex Delivery Anes PTL Lv   6 Current            5 Term 16   3260 g (7 lb 3 oz) F Vag-Spont   LYNDON      Name: Harmonyn   4 Term 09   3175 g (7 lb) M Vag-Spont   LYNDON      Name: Arvind   3 Term 03   3799 g (8 lb 6 oz) F Vag-Spont   LYNDON      Name: Janiya   2 Term 01   3317 g (7 lb 5 oz) F Vag-Spont   LYNDON      Name: Adriane   1 AB                   Past Medical History: Past Medical History:   Diagnosis Date   •  in first trimester 2012    ELECTIVE   • Multiple gestation       Past Surgical History Past Surgical History:   Procedure Laterality Date   • BREAST AUGMENTATION Bilateral    • INDUCED   2012    ELECTIVE   • WISDOM TOOTH EXTRACTION        Family History: Family History   Problem Relation Age of Onset   • Colon cancer Maternal Grandmother       Social History:  reports that she has been smoking Cigarettes.  She has been smoking about 0.50 packs per day. She has never used smokeless tobacco.   reports that she does not drink alcohol.   reports that she uses illicit drugs, including Methamphetamines and Benzodiazepines.        Review of Systems   Constitutional: Positive for appetite change  and fatigue.   Respiratory: Positive for cough, shortness of breath and wheezing.    Cardiovascular: Negative for leg swelling.   Gastrointestinal: Positive for nausea and vomiting. Negative for abdominal pain.   Genitourinary: Negative for dysuria, pelvic pain and vaginal bleeding.         Objective     Vital Signs Range for the last 24 hours  Temperature: Temp:  [100.1 °F (37.8 °C)] 100.1 °F (37.8 °C)   Temp Source: Temp src: Oral   BP: BP: (114-130)/(57-63) 114/57   Pulse: Heart Rate:  [116-130] 118   Respirations: Resp:  [20-26] 20   SPO2: SpO2:  [93 %-99 %] 97 %   O2 Amount (l/min): Flow (L/min):  [2-10] 2   O2 Devices O2 Device: nasal cannula   Weight: Weight:  [95.7 kg (211 lb)] 95.7 kg (211 lb)     Physical Examination: General appearance - oriented to person, place, and time and in mild to moderate distress  Mental status - alert, oriented to person, place, and time  Chest - wheezing noted throughout,   Heart - tachycardic, regular rhythm, Abdomen - soft, nontender, nondistended, no masses or organomegaly  no rebound tenderness noted  Pelvic - normal external genitalia, vulva, vagina, cervix, uterus and adnexa, VULVA: normal appearing vulva with no masses, tenderness or lesions, VAGINA: normal appearing vagina with normal color and discharge, no lesions  Extremities - no pedal edema noted    Presentation: Vertex/transverse   Cervix: Exam by:     Dilation:  5   Effacement:  70   Station:  0     Fetal Heart Rate Assessment   Method: Fetal HR Assessment Method: external   Beats/min: Fetal HR (Beats/Min):  (poor tracing r/t maternal movement)   Baseline: Fetal HR Baseline: normal range (110-160 bpm)   Varibility: Fetal HR Variability: moderate (amplitude range 6 to 25 bpm)   Accels: Fetal HR Accelerations: absent   Decels: Fetal HR Decelerations: absent   Tracing Category:       Uterine Assessment   Method: Method:  (pt up to br off monitor)   Frequency (min): Contraction Frequency (min): 2-6   Ctx Count in 10  min:     Duration: Contraction Duration (sec): 20-60   Intensity: Contraction Intensity: mild by palpation   Intensity by IUPC:     Resting Tone: Uterine Resting Tone: soft by palpation   Resting Tone by IUPC:     [place] Units:       Laboratory Results:   Radiology Review:  Other Studies:    Assessment/Plan     Active Problems:    Twin gestation in third trimester    Acute respiratory infection      Assessment & Plan    Assessment/Plan:  1.  Intrauterine pregnancy at 35w2d weeks gestation with reactive, reassuring fetal status x 2 - pt refused continuous monitoring    2. Acute Respiratory Infection - Rocephin/ Azithromycin/Tamiflu/Supportive O2, Influenza A/B not detected.  3.  Di/Di twin pregnancy last scan with PDC 11/3/17 50%/70% growth  4.  Tobacco abuse, history of drug use  .          Bibi Calvillo MD  12/17/2017  5:14 PM

## 2017-12-18 ENCOUNTER — ANESTHESIA EVENT (OUTPATIENT)
Dept: LABOR AND DELIVERY | Facility: HOSPITAL | Age: 37
End: 2017-12-18

## 2017-12-18 ENCOUNTER — ANESTHESIA (OUTPATIENT)
Dept: LABOR AND DELIVERY | Facility: HOSPITAL | Age: 37
End: 2017-12-18

## 2017-12-18 PROBLEM — O34.33 PREMATURE CERVICAL DILATION IN THIRD TRIMESTER: Status: ACTIVE | Noted: 2017-12-18

## 2017-12-18 LAB
ABO GROUP BLD: NORMAL
BLD GP AB SCN SERPL QL: NEGATIVE
DEPRECATED RDW RBC AUTO: 50.1 FL (ref 37–54)
ERYTHROCYTE [DISTWIDTH] IN BLOOD BY AUTOMATED COUNT: 14.2 % (ref 11.3–14.5)
HCT VFR BLD AUTO: 33 % (ref 34.5–44)
HGB BLD-MCNC: 11.1 G/DL (ref 11.5–15.5)
MCH RBC QN AUTO: 32.3 PG (ref 27–31)
MCHC RBC AUTO-ENTMCNC: 33.6 G/DL (ref 32–36)
MCV RBC AUTO: 95.9 FL (ref 80–99)
PLATELET # BLD AUTO: 178 10*3/MM3 (ref 150–450)
PMV BLD AUTO: 10.6 FL (ref 6–12)
RBC # BLD AUTO: 3.44 10*6/MM3 (ref 3.89–5.14)
RH BLD: POSITIVE
WBC NRBC COR # BLD: 20.07 10*3/MM3 (ref 3.5–10.8)

## 2017-12-18 PROCEDURE — 25010000002 ROPIVACAINE PER 1 MG: Performed by: ANESTHESIOLOGY

## 2017-12-18 PROCEDURE — 59025 FETAL NON-STRESS TEST: CPT

## 2017-12-18 PROCEDURE — C1755 CATHETER, INTRASPINAL: HCPCS | Performed by: ANESTHESIOLOGY

## 2017-12-18 PROCEDURE — 25010000002 ONDANSETRON PER 1 MG: Performed by: ANESTHESIOLOGY

## 2017-12-18 PROCEDURE — 86850 RBC ANTIBODY SCREEN: CPT | Performed by: OBSTETRICS & GYNECOLOGY

## 2017-12-18 PROCEDURE — 59020 FETAL CONTRACT STRESS TEST: CPT

## 2017-12-18 PROCEDURE — 25010000002 AZITHROMYCIN: Performed by: OBSTETRICS & GYNECOLOGY

## 2017-12-18 PROCEDURE — 86900 BLOOD TYPING SEROLOGIC ABO: CPT | Performed by: OBSTETRICS & GYNECOLOGY

## 2017-12-18 PROCEDURE — 25010000002 PENICILLIN G POTASSIUM PER 600000 UNITS: Performed by: OBSTETRICS & GYNECOLOGY

## 2017-12-18 PROCEDURE — 85027 COMPLETE CBC AUTOMATED: CPT | Performed by: OBSTETRICS & GYNECOLOGY

## 2017-12-18 PROCEDURE — 25010000003 MORPHINE PER 10 MG: Performed by: ANESTHESIOLOGY

## 2017-12-18 PROCEDURE — 25010000002 FENTANYL CITRATE (PF) 100 MCG/2ML SOLUTION: Performed by: ANESTHESIOLOGY

## 2017-12-18 PROCEDURE — 86901 BLOOD TYPING SEROLOGIC RH(D): CPT | Performed by: OBSTETRICS & GYNECOLOGY

## 2017-12-18 RX ORDER — DIPHENHYDRAMINE HYDROCHLORIDE 50 MG/ML
12.5 INJECTION INTRAMUSCULAR; INTRAVENOUS EVERY 8 HOURS PRN
Status: DISCONTINUED | OUTPATIENT
Start: 2017-12-18 | End: 2017-12-19 | Stop reason: HOSPADM

## 2017-12-18 RX ORDER — DIPHENHYDRAMINE HYDROCHLORIDE 50 MG/ML
25 INJECTION INTRAMUSCULAR; INTRAVENOUS NIGHTLY PRN
Status: DISCONTINUED | OUTPATIENT
Start: 2017-12-18 | End: 2017-12-19 | Stop reason: HOSPADM

## 2017-12-18 RX ORDER — LIDOCAINE HYDROCHLORIDE 10 MG/ML
5 INJECTION, SOLUTION INFILTRATION; PERINEURAL AS NEEDED
Status: DISCONTINUED | OUTPATIENT
Start: 2017-12-18 | End: 2017-12-19 | Stop reason: HOSPADM

## 2017-12-18 RX ORDER — ONDANSETRON 2 MG/ML
4 INJECTION INTRAMUSCULAR; INTRAVENOUS EVERY 6 HOURS PRN
Status: COMPLETED | OUTPATIENT
Start: 2017-12-18 | End: 2017-12-18

## 2017-12-18 RX ORDER — PROMETHAZINE HYDROCHLORIDE 25 MG/ML
12.5 INJECTION, SOLUTION INTRAMUSCULAR; INTRAVENOUS EVERY 6 HOURS PRN
Status: DISCONTINUED | OUTPATIENT
Start: 2017-12-18 | End: 2017-12-19 | Stop reason: SDUPTHER

## 2017-12-18 RX ORDER — SODIUM CHLORIDE 0.9 % (FLUSH) 0.9 %
1-10 SYRINGE (ML) INJECTION AS NEEDED
Status: DISCONTINUED | OUTPATIENT
Start: 2017-12-18 | End: 2017-12-19 | Stop reason: HOSPADM

## 2017-12-18 RX ORDER — ROPIVACAINE HYDROCHLORIDE 5 MG/ML
INJECTION, SOLUTION EPIDURAL; INFILTRATION; PERINEURAL AS NEEDED
Status: DISCONTINUED | OUTPATIENT
Start: 2017-12-18 | End: 2017-12-19 | Stop reason: SURG

## 2017-12-18 RX ORDER — LIDOCAINE HYDROCHLORIDE AND EPINEPHRINE BITARTRATE 20; .01 MG/ML; MG/ML
INJECTION, SOLUTION SUBCUTANEOUS AS NEEDED
Status: DISCONTINUED | OUTPATIENT
Start: 2017-12-18 | End: 2017-12-19 | Stop reason: SURG

## 2017-12-18 RX ORDER — ROPIVACAINE HYDROCHLORIDE 2 MG/ML
15 INJECTION, SOLUTION EPIDURAL; INFILTRATION; PERINEURAL CONTINUOUS
Status: DISCONTINUED | OUTPATIENT
Start: 2017-12-18 | End: 2017-12-19

## 2017-12-18 RX ORDER — DIPHENHYDRAMINE HCL 25 MG
25 CAPSULE ORAL NIGHTLY PRN
Status: DISCONTINUED | OUTPATIENT
Start: 2017-12-18 | End: 2017-12-19 | Stop reason: HOSPADM

## 2017-12-18 RX ORDER — TRISODIUM CITRATE DIHYDRATE AND CITRIC ACID MONOHYDRATE 500; 334 MG/5ML; MG/5ML
30 SOLUTION ORAL ONCE
Status: COMPLETED | OUTPATIENT
Start: 2017-12-18 | End: 2017-12-18

## 2017-12-18 RX ORDER — NICOTINE 21 MG/24HR
1 PATCH, TRANSDERMAL 24 HOURS TRANSDERMAL
Status: DISCONTINUED | OUTPATIENT
Start: 2017-12-18 | End: 2017-12-22 | Stop reason: HOSPADM

## 2017-12-18 RX ORDER — OXYTOCIN-SODIUM CHLORIDE 0.9% IV SOLN 30 UNIT/500ML 30-0.9/5 UT/ML-%
4-24 SOLUTION INTRAVENOUS
Status: DISCONTINUED | OUTPATIENT
Start: 2017-12-18 | End: 2017-12-19

## 2017-12-18 RX ORDER — PROMETHAZINE HYDROCHLORIDE 12.5 MG/1
12.5 SUPPOSITORY RECTAL EVERY 6 HOURS PRN
Status: DISCONTINUED | OUTPATIENT
Start: 2017-12-18 | End: 2017-12-19 | Stop reason: SDUPTHER

## 2017-12-18 RX ORDER — EPHEDRINE SULFATE/0.9% NACL/PF 50 MG/10ML
5 SYRINGE (ML) INTRAVENOUS
Status: DISCONTINUED | OUTPATIENT
Start: 2017-12-18 | End: 2017-12-19 | Stop reason: HOSPADM

## 2017-12-18 RX ORDER — FAMOTIDINE 20 MG/1
20 TABLET, FILM COATED ORAL EVERY 12 HOURS SCHEDULED
Status: DISCONTINUED | OUTPATIENT
Start: 2017-12-18 | End: 2017-12-19 | Stop reason: HOSPADM

## 2017-12-18 RX ORDER — FAMOTIDINE 10 MG/ML
20 INJECTION, SOLUTION INTRAVENOUS ONCE AS NEEDED
Status: DISCONTINUED | OUTPATIENT
Start: 2017-12-18 | End: 2017-12-19 | Stop reason: HOSPADM

## 2017-12-18 RX ORDER — FAMOTIDINE 10 MG/ML
20 INJECTION, SOLUTION INTRAVENOUS EVERY 12 HOURS SCHEDULED
Status: DISCONTINUED | OUTPATIENT
Start: 2017-12-18 | End: 2017-12-19 | Stop reason: HOSPADM

## 2017-12-18 RX ORDER — SODIUM CHLORIDE, SODIUM LACTATE, POTASSIUM CHLORIDE, CALCIUM CHLORIDE 600; 310; 30; 20 MG/100ML; MG/100ML; MG/100ML; MG/100ML
125 INJECTION, SOLUTION INTRAVENOUS CONTINUOUS
Status: DISCONTINUED | OUTPATIENT
Start: 2017-12-18 | End: 2017-12-22 | Stop reason: HOSPADM

## 2017-12-18 RX ORDER — ZOLPIDEM TARTRATE 5 MG/1
5 TABLET ORAL NIGHTLY PRN
Status: DISCONTINUED | OUTPATIENT
Start: 2017-12-18 | End: 2017-12-19 | Stop reason: HOSPADM

## 2017-12-18 RX ORDER — ACETAMINOPHEN 325 MG/1
650 TABLET ORAL EVERY 4 HOURS PRN
Status: DISCONTINUED | OUTPATIENT
Start: 2017-12-18 | End: 2017-12-19 | Stop reason: SDUPTHER

## 2017-12-18 RX ORDER — PROMETHAZINE HYDROCHLORIDE 12.5 MG/1
12.5 TABLET ORAL EVERY 6 HOURS PRN
Status: DISCONTINUED | OUTPATIENT
Start: 2017-12-18 | End: 2017-12-19 | Stop reason: SDUPTHER

## 2017-12-18 RX ORDER — LIDOCAINE HYDROCHLORIDE AND EPINEPHRINE 15; 5 MG/ML; UG/ML
INJECTION, SOLUTION EPIDURAL AS NEEDED
Status: DISCONTINUED | OUTPATIENT
Start: 2017-12-18 | End: 2017-12-19 | Stop reason: SURG

## 2017-12-18 RX ORDER — FENTANYL CITRATE 50 UG/ML
INJECTION, SOLUTION INTRAMUSCULAR; INTRAVENOUS AS NEEDED
Status: DISCONTINUED | OUTPATIENT
Start: 2017-12-18 | End: 2017-12-19 | Stop reason: SURG

## 2017-12-18 RX ORDER — METOCLOPRAMIDE HYDROCHLORIDE 5 MG/ML
10 INJECTION INTRAMUSCULAR; INTRAVENOUS ONCE AS NEEDED
Status: DISCONTINUED | OUTPATIENT
Start: 2017-12-18 | End: 2017-12-19 | Stop reason: HOSPADM

## 2017-12-18 RX ADMIN — ACETAMINOPHEN 650 MG: 325 TABLET, FILM COATED ORAL at 07:26

## 2017-12-18 RX ADMIN — SODIUM CHLORIDE, POTASSIUM CHLORIDE, SODIUM LACTATE AND CALCIUM CHLORIDE 125 ML/HR: 600; 310; 30; 20 INJECTION, SOLUTION INTRAVENOUS at 20:21

## 2017-12-18 RX ADMIN — ONDANSETRON 4 MG: 2 INJECTION INTRAMUSCULAR; INTRAVENOUS at 23:57

## 2017-12-18 RX ADMIN — SODIUM CHLORIDE 50 ML/HR: 9 INJECTION, SOLUTION INTRAVENOUS at 01:05

## 2017-12-18 RX ADMIN — ROPIVACAINE HYDROCHLORIDE 10 ML: 5 INJECTION, SOLUTION EPIDURAL; INFILTRATION; PERINEURAL at 19:46

## 2017-12-18 RX ADMIN — FENTANYL CITRATE 100 MCG: 50 INJECTION, SOLUTION INTRAMUSCULAR; INTRAVENOUS at 19:46

## 2017-12-18 RX ADMIN — FAMOTIDINE 20 MG: 10 INJECTION, SOLUTION INTRAVENOUS at 10:30

## 2017-12-18 RX ADMIN — SODIUM CHLORIDE, POTASSIUM CHLORIDE, SODIUM LACTATE AND CALCIUM CHLORIDE 125 ML/HR: 600; 310; 30; 20 INJECTION, SOLUTION INTRAVENOUS at 14:04

## 2017-12-18 RX ADMIN — AZITHROMYCIN 500 MG: 500 INJECTION, POWDER, LYOPHILIZED, FOR SOLUTION INTRAVENOUS at 16:42

## 2017-12-18 RX ADMIN — SODIUM CHLORIDE, POTASSIUM CHLORIDE, SODIUM LACTATE AND CALCIUM CHLORIDE: 600; 310; 30; 20 INJECTION, SOLUTION INTRAVENOUS at 23:57

## 2017-12-18 RX ADMIN — SODIUM CHLORIDE, POTASSIUM CHLORIDE, SODIUM LACTATE AND CALCIUM CHLORIDE 1000 ML: 600; 310; 30; 20 INJECTION, SOLUTION INTRAVENOUS at 19:10

## 2017-12-18 RX ADMIN — KETAMINE HCL-NACL SOLN PREF SY 50 MG/5ML-0.9% (10MG/ML) 100 MG: 10 SOLUTION PREFILLED SYRINGE at 23:55

## 2017-12-18 RX ADMIN — ACETAMINOPHEN 650 MG: 325 TABLET, FILM COATED ORAL at 15:45

## 2017-12-18 RX ADMIN — SODIUM CHLORIDE 15 MILLION UNITS: 9 INJECTION, SOLUTION INTRAVENOUS at 07:47

## 2017-12-18 RX ADMIN — FENTANYL CITRATE 50 MCG: 50 INJECTION, SOLUTION INTRAMUSCULAR; INTRAVENOUS at 23:55

## 2017-12-18 RX ADMIN — LIDOCAINE HYDROCHLORIDE AND EPINEPHRINE 15 ML: 20; 10 INJECTION, SOLUTION INFILTRATION; PERINEURAL at 23:50

## 2017-12-18 RX ADMIN — NICOTINE 1 PATCH: 14 PATCH TRANSDERMAL at 16:42

## 2017-12-18 RX ADMIN — SODIUM CITRATE AND CITRIC ACID MONOHYDRATE 30 ML: 500; 334 SOLUTION ORAL at 22:25

## 2017-12-18 RX ADMIN — LIDOCAINE HYDROCHLORIDE AND EPINEPHRINE 3 ML: 15; 5 INJECTION, SOLUTION EPIDURAL at 19:46

## 2017-12-18 RX ADMIN — ROPIVACAINE HYDROCHLORIDE 15 ML/HR: 2 INJECTION, SOLUTION EPIDURAL; INFILTRATION at 19:46

## 2017-12-18 RX ADMIN — OXYTOCIN 4 MILLI-UNITS/MIN: 10 INJECTION INTRAVENOUS at 21:35

## 2017-12-18 RX ADMIN — OXYTOCIN 40 UNITS: 10 INJECTION, SOLUTION INTRAMUSCULAR; INTRAVENOUS at 23:57

## 2017-12-18 RX ADMIN — MORPHINE SULFATE 4 MG: 0.5 INJECTION, SOLUTION EPIDURAL; INTRATHECAL; INTRAVENOUS at 23:59

## 2017-12-18 NOTE — PLAN OF CARE
Problem: Patient Care Overview (Adult)  Goal: Plan of Care Review  Outcome: Ongoing (interventions implemented as appropriate)    12/17/17 2014   Coping/Psychosocial Response Interventions   Plan Of Care Reviewed With patient   Patient Care Overview   Progress improving       Goal: Adult Individualization and Mutuality  Outcome: Ongoing (interventions implemented as appropriate)    12/17/17 2014   Individualization   Patient Specific Preferences patient wants to go home    Patient Specific Goals patient wants to go home   Patient Specific Interventions intravenous abx, fluid therapy        Goal: Discharge Needs Assessment  Outcome: Ongoing (interventions implemented as appropriate)    12/17/17 2014   Discharge Needs Assessment   Concerns To Be Addressed no discharge needs identified   Readmission Within The Last 30 Days no previous admission in last 30 days   Discharge Disposition still a patient   Living Environment   Transportation Available car         Problem: Pregnancy, Multiple Gestation (Adult,Obstetrics,Pediatric)  Goal: Signs and Symptoms of Listed Potential Problems Will be Absent or Manageable (Pregnancy, Multiple Gestation)  Outcome: Ongoing (interventions implemented as appropriate)    12/17/17 2014   Pregnancy, Multiple Gestation   Problems Assessed (Multiple Gestation Pregnancy) all   Problems Present (Multiple Gestation Pregnancy) none

## 2017-12-18 NOTE — PROGRESS NOTES
Viviane says she feel fine and would like to go home.    Due to her previous noted cervical exam of 5 cm, decided to check her cervix before discharge.    She is currently 7 cm with bulging bag.

## 2017-12-18 NOTE — PAYOR COMM NOTE
"Luis Burk (37 y.o. Female)     Date of Birth Social Security Number Address Home Phone MRN    1980  [facility] [id number] [phone] [id number]    Orthodox Marital Status          None        Admission Date Admission Type Admitting Provider Attending Provider Department, Room/Bed    12/17/17 Elective Miguelito Powell MD Thomas, Miguelito Wesley MD [facility] LABOR DELIVERY, N316/1    Discharge Date Discharge Disposition Discharge Destination                      Attending Provider: Miguelito Powell MD     Allergies:  No Known Allergies    Isolation:  None   Infection:  None   Code Status:  FULL    Ht:  167.6 cm (66\")   Wt:  95.7 kg (211 lb)    Admission Cmt:  None   Principal Problem:  Acute respiratory infection [J22]                 Active Insurance as of 12/17/2017     Primary Coverage     Payor Plan Insurance Group Employer/Plan Group    PASSPORT PASSPORT MEDICAID     Payor Plan Address Payor Plan Phone Number Effective From Effective To    [address] [phone] 10/24/2017     [place] [address]       Subscriber Name Subscriber Birth Date Member ID       LUIS BURK 1980 [id number]                 Emergency Contacts      (Rel.) Home Phone Work Phone Mobile Phone    Sara Trevizo (Mother) -- -- [phone]            Treatment Team     Provider Relationship Specialty Contact    Miguelito Powell MD Attending --  [phone]    Sherri Tomlinson RN Registered Nurse --      Radha Dye RD Dietitian Nutrition  [phone]          Problem List           Codes Noted - Resolved       Hospital    Premature cervical dilation in third trimester ICD-10-CM: O34.33  ICD-9-CM: 654.53 12/18/2017 - Present    Twin gestation in third trimester ICD-10-CM: O30.003  ICD-9-CM: 651.03 12/17/2017 - Present    * (Principal)Acute respiratory infection ICD-10-CM: J22  ICD-9-CM: 519.8 12/17/2017 - Present    Tobacco abuse " ICD-10-CM: Z72.0  ICD-9-CM: 305.1 2017 - Present       Non-Hospital    Pregnancy ICD-10-CM: Z34.90  ICD-9-CM: V22.2 2017 - Present    Dichorionic diamniotic twin pregnancy in third trimester ICD-10-CM: O30.043  ICD-9-CM: 651.03, V91.03 2017 - Present    Pregnancy with third trimester bleeding ICD-10-CM: O46.93  ICD-9-CM: 641.93 11/3/2017 - Present             History & Physical      Bibi Calvillo MD at 2017  4:59 PM          [facility]  Obstetric History and Physical    No chief complaint on file.      Subjective     Patient is a 37 y.o. female  currently at 35w2d, who presents with shortness of breath, feeling poorly, nausea and vomiting, and fever.    Her prenatal care is complicated by  substance abuse  last drug use 3/22/17 per patient.  Her previous obstetric/gynecological history is noted for four term vaginal deliveries.  Patient is Hep B Ab positive and Hep C Ab positive.    The following portions of the patients history were reviewed and updated as appropriate: current medications, allergies, past medical history, past surgical history, past family history, past social history and problem list .       Prenatal Information:  Prenatal Results         Initial Prenatal Labs Ref. Range Date Time   Hemoglobin       Hematocrit       Platelets  151 10*3/mm3 150 - 450 10*3/mm3 17 1525   Rubella IgG       Hepatitis B SAg  Negative  Negative 17 0509   Hepatitis C Ab       RPR       ABO  A   17 0909   Rh  Positive   17 0909   Antibody Screen       HIV       Urine Culture  70,000-80,000 CFU/mL Normal Urogenital Radha   17 1055   Gonorrhea       Chlamydia       TSH       2nd and 3rd Trimester Ref. Range Date Time   Hemoglobin (repeated)  11.8 g/dL 11.5 - 15.5 g/dL 17 1525   Hematocrit (repeated)  34.6 % 34.5 - 44.0 % 17 1525   GCT       Antibody Screen (repeated)  Negative   17 0909   GTT Fasting       GTT 1 Hr       GTT 2 Hr       GTT 3 Hr        Group B Strep       Drug Screening Ref. Range Date Time   Amphetamine Screen  Negative  Negative 17 1543   Barbiturate Screen  Negative  Negative 17 1543   Benzodiazepine Screen  Negative  Negative 17 1543   Methadone Screen  Negative  Negative 17 1543   Phencyclidine Screen  Negative  Negative 17 1543   Opiates Screen  Negative  Negative 17 1543   THC Screen  Negative  Negative 17 1543   Cocaine Screen  Negative  Negative 17 1543   Propoxyphene Screen  Negative  Negative 17 1543   Buprenorphine Screen  Negative  Negative 17 1543   Methamphetamine Screen  Negative  Negative 17 1543   Oxycodone Screen  Negative  Negative 17 1543   Tryicyclic Antidepressants Screen  Negative  Negative 17 1543   Other (Risk screening) Ref. Range Date Time   Varicella IgG       Parvovirus IgG       CMV IgG       Cystic Fibrosis       Hemoglobin electrophoresis       NIPT       MSAFP-4       AFP (for NTD only)              Legend: ^: Historical            View all results for this pregnancy             Past OB History:     Obstetric History       T4      L4     SAB0   TAB0   Ectopic0   Multiple0   Live Births4       # Outcome Date GA Lbr Sami/2nd Weight Sex Delivery Anes PTL Lv   6 Current            5 Term 16   3260 g (7 lb 3 oz) F Vag-Spont   LYNDON      Name: Carmyn   4 Term 09   3175 g (7 lb) M Vag-Spont   LYNDON      Name: Arvind   3 Term 03   3799 g (8 lb 6 oz) F Vag-Spont   LYNDON      Name: Janiya   2 Term 01   3317 g (7 lb 5 oz) F Vag-Spont   LYNDON      Name: [place]   1 AB                   Past Medical History: Past Medical History:   Diagnosis Date   •  in first trimester 2012    ELECTIVE   • Multiple gestation       Past Surgical History Past Surgical History:   Procedure Laterality Date   • BREAST AUGMENTATION Bilateral 2004   • INDUCED   2012    ELECTIVE   • WISDOM TOOTH EXTRACTION        Family  History: Family History   Problem Relation Age of Onset   • Colon cancer Maternal Grandmother       Social History:  reports that she has been smoking Cigarettes.  She has been smoking about 0.50 packs per day. She has never used smokeless tobacco.   reports that she does not drink alcohol.   reports that she uses illicit drugs, including Methamphetamines and Benzodiazepines.        Review of Systems   Constitutional: Positive for appetite change and fatigue.   Respiratory: Positive for cough, shortness of breath and wheezing.    Cardiovascular: Negative for leg swelling.   Gastrointestinal: Positive for nausea and vomiting. Negative for abdominal pain.   Genitourinary: Negative for dysuria, pelvic pain and vaginal bleeding.         Objective     Vital Signs Range for the last 24 hours  Temperature: Temp:  [100.1 °F (37.8 °C)] 100.1 °F (37.8 °C)   Temp Source: Temp src: Oral   BP: BP: (114-130)/(57-63) 114/57   Pulse: Heart Rate:  [116-130] 118   Respirations: Resp:  [20-26] 20   SPO2: SpO2:  [93 %-99 %] 97 %   O2 Amount (l/min): Flow (L/min):  [2-10] 2   O2 Devices O2 Device: nasal cannula   Weight: Weight:  [95.7 kg (211 lb)] 95.7 kg (211 lb)     Physical Examination: General appearance - oriented to person, place, and time and in mild to moderate distress  Mental status - alert, oriented to person, place, and time  Chest - wheezing noted throughout,   Heart - tachycardic, regular rhythm, Abdomen - soft, nontender, nondistended, no masses or organomegaly  no rebound tenderness noted  Pelvic - normal external genitalia, vulva, vagina, cervix, uterus and adnexa, VULVA: normal appearing vulva with no masses, tenderness or lesions, VAGINA: normal appearing vagina with normal color and discharge, no lesions  Extremities - no pedal edema noted    Presentation: Vertex/transverse   Cervix: Exam by:     Dilation:  5   Effacement:  70   Station:  0     Fetal Heart Rate Assessment   Method: Fetal HR Assessment Method:  external   Beats/min: Fetal HR (Beats/Min):  (poor tracing r/t maternal movement)   Baseline: Fetal HR Baseline: normal range (110-160 bpm)   Varibility: Fetal HR Variability: moderate (amplitude range 6 to 25 bpm)   Accels: Fetal HR Accelerations: absent   Decels: Fetal HR Decelerations: absent   Tracing Category:       Uterine Assessment   Method: Method:  (pt up to br off monitor)   Frequency (min): Contraction Frequency (min): 2-6   Ctx Count in 10 min:     Duration: Contraction Duration (sec): 20-60   Intensity: Contraction Intensity: mild by palpation   Intensity by IUPC:     Resting Tone: Uterine Resting Tone: soft by palpation   Resting Tone by IUPC:     [place] Units:       Laboratory Results:   Radiology Review:  Other Studies:    Assessment/Plan     Active Problems:    Twin gestation in third trimester    Acute respiratory infection      Assessment & Plan    Assessment/Plan:  1.  Intrauterine pregnancy at 35w2d weeks gestation with reactive, reassuring fetal status x 2 - pt refused continuous monitoring    2. Acute Respiratory Infection - Rocephin/ Azithromycin/Tamiflu/Supportive O2, Influenza A/B not detected.  3.  Di/Di twin pregnancy last scan with PDC 11/3/17 50%/70% growth  4.  Tobacco abuse, history of drug use  .          Bibi Calvillo MD  12/17/2017  5:14 PM     Electronically signed by Bibi Calvillo MD at 12/17/2017  6:40 PM        Vital Signs (last 24 hours)       12/17 0700  -  12/18 0659 12/18 0700  -  12/18 1624   Most Recent    Temp (°F) 98 -  100.1    97.9 -  98.3     98.3 (36.8)    Heart Rate 106 -  (!)130    86 -  88     88    Resp 16 - 26 18     18    BP 92/51 -  130/60    105/56 -  119/65     119/65    SpO2 (%) 93 -  99       94          Hospital Medications (all)       Dose Frequency Start End    acetaminophen (TYLENOL) tablet 650 mg 650 mg Every 6 Hours PRN 12/17/2017     Sig - Route: Take 2 tablets by mouth Every 6 (Six) Hours As Needed for Mild Pain , Headache or  "Fever. - Oral    acetaminophen (TYLENOL) tablet 650 mg 650 mg Every 6 Hours PRN 12/17/2017     Sig - Route: Take 2 tablets by mouth Every 6 (Six) Hours As Needed for Mild Pain , Headache or Fever. - Oral    acetaminophen (TYLENOL) tablet 650 mg 650 mg Every 4 Hours PRN 12/18/2017     Sig - Route: Take 2 tablets by mouth Every 4 (Four) Hours As Needed for Mild Pain  or Headache. - Oral    albuterol (PROVENTIL) nebulizer solution 0.5% 2.5 mg/0.5mL 2.5 mg Every 4 Hours - RT 12/17/2017     Sig - Route: Take 0.5 mL by nebulization Every 4 (Four) Hours. - Nebulization    AZITHROMYCIN 500 MG/250 ML 0.9% NS IVPB (MBP) 500 mg Every 24 Hours 12/17/2017 12/20/2017    Sig - Route: Infuse 250 mL into a venous catheter Daily. - Intravenous    butorphanol (STADOL) injection 2 mg 2 mg Every 3 Hours PRN 12/18/2017     Sig - Route: Infuse 1 mL into a venous catheter Every 3 (Three) Hours As Needed for Severe Pain . - Intravenous    butorphanol (STADOL) injection 2 mg 2 mg Every 2 Hours PRN 12/18/2017     Sig - Route: Infuse 1 mL into a venous catheter Every 2 (Two) Hours As Needed for Severe Pain . - Intravenous    cefTRIAXone (ROCEPHIN) IVPB 2 g 2 g Every 24 Hours 12/17/2017 12/17/2017    Sig - Route: Infuse 50 mL into a venous catheter Daily. - Intravenous    diphenhydrAMINE (BENADRYL) capsule 25 mg 25 mg Nightly PRN 12/18/2017     Sig - Route: Take 1 capsule by mouth At Night As Needed for Sleep. - Oral    Linked Group 1:  \"Or\" Linked Group Details        diphenhydrAMINE (BENADRYL) injection 25 mg 25 mg Nightly PRN 12/18/2017     Sig - Route: Infuse 0.5 mL into a venous catheter At Night As Needed for Sleep. - Intravenous    Linked Group 1:  \"Or\" Linked Group Details        famotidine (PEPCID) injection 20 mg 20 mg Every 12 Hours Scheduled 12/18/2017     Sig - Route: Infuse 2 mL into a venous catheter Every 12 (Twelve) Hours. - Intravenous    Linked Group 2:  \"Or\" Linked Group Details        famotidine (PEPCID) tablet 20 mg 20 mg " "Every 12 Hours Scheduled 12/18/2017     Sig - Route: Take 1 tablet by mouth Every 12 (Twelve) Hours. - Oral    Linked Group 2:  \"Or\" Linked Group Details        lactated ringers bolus 1,000 mL 1,000 mL Once 12/18/2017     Sig - Route: Infuse 1,000 mL into a venous catheter 1 (One) Time. - Intravenous    lactated ringers infusion 125 mL/hr Continuous 12/18/2017     Sig - Route: Infuse 125 mL/hr into a venous catheter Continuous. - Intravenous    lidocaine (XYLOCAINE) 1 % injection 5 mL 5 mL As Needed 12/18/2017     Sig - Route: Inject 5 mL into the skin As Needed (IV starts). - Intradermal    nicotine (NICODERM CQ) 14 MG/24HR patch 1 patch 1 patch Every 24 Hours Scheduled 12/18/2017     Sig - Route: Place 1 patch on the skin Daily. - Transdermal    penicillin G 15 million unit (5 million unit bolus plus continuous infusion) 15 Million Units Once 12/18/2017     Sig - Route: Infuse 15 Million Units into a venous catheter 1 (One) Time. - Intravenous    Linked Group 3:  \"Followed by\" Linked Group Details        penicillin G 15 million units in NaCl 0.9% 500 mL (continuous infusion) 15 Million Units Every 24 Hours 12/18/2017 12/19/2017    Sig - Route: Infuse 15 Million Units into a venous catheter Daily. - Intravenous    Linked Group 3:  \"Followed by\" Linked Group Details        promethazine (PHENERGAN) injection 12.5 mg 12.5 mg Every 6 Hours PRN 12/18/2017     Sig - Route: Infuse 0.5 mL into a venous catheter Every 6 (Six) Hours As Needed for Nausea or Vomiting. - Intravenous    Linked Group 4:  \"Or\" Linked Group Details        promethazine (PHENERGAN) injection 12.5 mg 12.5 mg Every 6 Hours PRN 12/18/2017     Sig - Route: Inject 0.5 mL into the shoulder, thigh, or buttocks Every 6 (Six) Hours As Needed for Nausea or Vomiting. - Intramuscular    Linked Group 4:  \"Or\" Linked Group Details        promethazine (PHENERGAN) suppository 12.5 mg 12.5 mg Every 6 Hours PRN 12/18/2017     Sig - Route: Insert 1 suppository into the " "rectum Every 6 (Six) Hours As Needed for Nausea or Vomiting. - Rectal    Linked Group 4:  \"Or\" Linked Group Details        promethazine (PHENERGAN) tablet 12.5 mg 12.5 mg Every 6 Hours PRN 12/18/2017     Sig - Route: Take 1 tablet by mouth Every 6 (Six) Hours As Needed for Nausea or Vomiting. - Oral    Linked Group 4:  \"Or\" Linked Group Details        sodium chloride 0.9 % bolus 500 mL 500 mL Once 12/17/2017 12/17/2017    Sig - Route: Infuse 500 mL into a venous catheter 1 (One) Time. - Intravenous    sodium chloride 0.9 % flush 1-10 mL 1-10 mL As Needed 12/18/2017     Sig - Route: Infuse 1-10 mL into a venous catheter As Needed for Line Care. - Intravenous    sodium chloride 0.9 % infusion 50 mL/hr Continuous 12/17/2017     Sig - Route: Infuse 50 mL/hr into a venous catheter Continuous. - Intravenous    zolpidem (AMBIEN) tablet 5 mg 5 mg Nightly PRN 12/18/2017 12/28/2017    Sig - Route: Take 1 tablet by mouth At Night As Needed for Sleep (if not in active labor). - Oral    albuterol (PROVENTIL) nebulizer solution 0.5% 2.5 mg/0.5mL (Discontinued) 1.25 mg Every 4 Hours - RT 12/17/2017 12/17/2017    Sig - Route: Take 0.25 mL by nebulization Every 4 (Four) Hours. - Nebulization    oseltamivir (TAMIFLU) capsule 75 mg (Discontinued) 75 mg Every 12 Hours 12/17/2017 12/18/2017    Sig - Route: Take 1 capsule by mouth Every 12 (Twelve) Hours. - Oral            Lab Results (last 24 hours)     Procedure Component Value Units Date/Time    Influenza A & B, RT PCR - Swab, Nasopharynx [[id number]]  (Normal) Collected:  12/17/17 1543    Specimen:  Swab from Nasopharynx Updated:  12/17/17 1627     Influenza A PCR Not Detected     Influenza B PCR Not Detected    Lactic Acid, Plasma [[id number]]  (Normal) Collected:  12/17/17 1704    Specimen:  Blood Updated:  12/17/17 1717     Lactate 1.6 mmol/L       Falsely depressed results may occur on samples drawn from patients receiving N-Acetylcysteine (NAC) or Metamizole.       Urine Culture - " Urine, Urine, Clean Catch [[id number]]  (Normal) Collected:  12/17/17 1543    Specimen:  Urine from Urine, Clean Catch Updated:  12/18/17 0702     Urine Culture No growth    CBC (No Diff) [[id number]]  (Abnormal) Collected:  12/18/17 0709    Specimen:  Blood Updated:  12/18/17 0743     WBC 20.07 (H) 10*3/mm3      RBC 3.44 (L) 10*6/mm3      Hemoglobin 11.1 (L) g/dL      Hematocrit 33.0 (L) %      MCV 95.9 fL      MCH 32.3 (H) pg      MCHC 33.6 g/dL      RDW 14.2 %      RDW-SD 50.1 fl      MPV 10.6 fL      Platelets 178 10*3/mm3         Imaging Results (last 24 hours)     ** No results found for the last 24 hours. **           Physician Progress Notes (last 24 hours) (Notes from 12/17/2017  4:24 PM through 12/18/2017  4:24 PM)      Jerrod Garza MD at 12/18/2017  6:59 AM  Version 1 of 1         Viviane says she feel fine and would like to go home.    Due to her previous noted cervical exam of 5 cm, decided to check her cervix before discharge.    She is currently 7 cm with bulging bag.     Electronically signed by Jerrod Garza MD at 12/18/2017  7:00 AM        Consult Notes (last 24 hours) (Notes from 12/17/2017  4:24 PM through 12/18/2017  4:24 PM)     No notes of this type exist for this encounter.

## 2017-12-19 ENCOUNTER — APPOINTMENT (OUTPATIENT)
Dept: GENERAL RADIOLOGY | Facility: HOSPITAL | Age: 37
End: 2017-12-19

## 2017-12-19 LAB
ATMOSPHERIC PRESS: ABNORMAL MMHG
ATMOSPHERIC PRESS: ABNORMAL MMHG
BACTERIA SPEC AEROBE CULT: NORMAL
BACTERIA SPEC AEROBE CULT: NORMAL
BASE EXCESS BLDCOA CALC-SCNC: -2.3 MMOL/L (ref 0–2)
BASE EXCESS BLDCOV CALC-SCNC: -2.2 MMOL/L (ref 0–2)
BDY SITE: ABNORMAL
CO2 BLDA-SCNC: 25.3 MMOL/L (ref 22–33)
CO2 BLDA-SCNC: 29.1 MMOL/L (ref 22–33)
HCO3 BLDCOA-SCNC: 27.1 MMOL/L (ref 16.9–20.5)
HCO3 BLDCOV-SCNC: 23.9 MMOL/L (ref 18.6–21.4)
HGB BLDA-MCNC: 16.5 G/DL (ref 14–18)
HGB BLDA-MCNC: 16.7 G/DL (ref 14–18)
HOROWITZ INDEX BLD+IHG-RTO: 21 %
HOROWITZ INDEX BLD+IHG-RTO: 21 %
MODALITY: ABNORMAL
MODALITY: ABNORMAL
PCO2 BLDCOA: 64.6 MMHG (ref 43.3–54.9)
PCO2 BLDCOV: 44.5 MM HG (ref 30–60)
PH BLDCOA: 7.23 PH UNITS (ref 7.2–7.3)
PH BLDCOV: 7.34 PH UNITS (ref 7.19–7.46)
PO2 BLDCOA: 15.1 MMHG (ref 11.5–43.3)
PO2 BLDCOV: 29.1 MM HG
SAO2 % BLDCOA: 21.9 %
SAO2 % BLDCOA: ABNORMAL % (ref 45–75)
SAO2 % BLDCOV: 66.1 %

## 2017-12-19 PROCEDURE — 82805 BLOOD GASES W/O2 SATURATION: CPT | Performed by: OBSTETRICS & GYNECOLOGY

## 2017-12-19 PROCEDURE — 74000 HC ABDOMEN KUB: CPT

## 2017-12-19 PROCEDURE — 59409 OBSTETRICAL CARE: CPT | Performed by: OBSTETRICS & GYNECOLOGY

## 2017-12-19 PROCEDURE — 88307 TISSUE EXAM BY PATHOLOGIST: CPT | Performed by: OBSTETRICS & GYNECOLOGY

## 2017-12-19 PROCEDURE — 25010000003 CEFAZOLIN IN DEXTROSE 2-4 GM/100ML-% SOLUTION: Performed by: OBSTETRICS & GYNECOLOGY

## 2017-12-19 PROCEDURE — 25010000002 HYDROMORPHONE PER 4 MG: Performed by: ANESTHESIOLOGY

## 2017-12-19 PROCEDURE — 25010000002 KETOROLAC TROMETHAMINE PER 15 MG: Performed by: OBSTETRICS & GYNECOLOGY

## 2017-12-19 PROCEDURE — 59514 CESAREAN DELIVERY ONLY: CPT | Performed by: OBSTETRICS & GYNECOLOGY

## 2017-12-19 RX ORDER — PROMETHAZINE HYDROCHLORIDE 25 MG/ML
12.5 INJECTION, SOLUTION INTRAMUSCULAR; INTRAVENOUS EVERY 6 HOURS PRN
Status: DISCONTINUED | OUTPATIENT
Start: 2017-12-19 | End: 2017-12-19 | Stop reason: HOSPADM

## 2017-12-19 RX ORDER — ACETAMINOPHEN 325 MG/1
650 TABLET ORAL ONCE AS NEEDED
Status: DISCONTINUED | OUTPATIENT
Start: 2017-12-19 | End: 2017-12-19 | Stop reason: HOSPADM

## 2017-12-19 RX ORDER — DEXTROSE, SODIUM CHLORIDE, SODIUM LACTATE, POTASSIUM CHLORIDE, AND CALCIUM CHLORIDE 5; .6; .31; .03; .02 G/100ML; G/100ML; G/100ML; G/100ML; G/100ML
125 INJECTION, SOLUTION INTRAVENOUS CONTINUOUS
Status: DISCONTINUED | OUTPATIENT
Start: 2017-12-19 | End: 2017-12-22 | Stop reason: HOSPADM

## 2017-12-19 RX ORDER — SIMETHICONE 80 MG
80 TABLET,CHEWABLE ORAL 4 TIMES DAILY PRN
Status: DISCONTINUED | OUTPATIENT
Start: 2017-12-19 | End: 2017-12-22 | Stop reason: HOSPADM

## 2017-12-19 RX ORDER — CARBOPROST TROMETHAMINE 250 UG/ML
250 INJECTION, SOLUTION INTRAMUSCULAR AS NEEDED
Status: DISCONTINUED | OUTPATIENT
Start: 2017-12-19 | End: 2017-12-19 | Stop reason: HOSPADM

## 2017-12-19 RX ORDER — PROMETHAZINE HYDROCHLORIDE 25 MG/ML
12.5 INJECTION, SOLUTION INTRAMUSCULAR; INTRAVENOUS EVERY 6 HOURS PRN
Status: DISCONTINUED | OUTPATIENT
Start: 2017-12-19 | End: 2017-12-22 | Stop reason: HOSPADM

## 2017-12-19 RX ORDER — PROMETHAZINE HYDROCHLORIDE 12.5 MG/1
12.5 TABLET ORAL EVERY 6 HOURS PRN
Status: DISCONTINUED | OUTPATIENT
Start: 2017-12-19 | End: 2017-12-19 | Stop reason: HOSPADM

## 2017-12-19 RX ORDER — OXYCODONE HYDROCHLORIDE AND ACETAMINOPHEN 5; 325 MG/1; MG/1
2 TABLET ORAL EVERY 4 HOURS PRN
Status: DISCONTINUED | OUTPATIENT
Start: 2017-12-19 | End: 2017-12-19 | Stop reason: HOSPADM

## 2017-12-19 RX ORDER — DOCUSATE SODIUM 100 MG/1
100 CAPSULE, LIQUID FILLED ORAL 2 TIMES DAILY PRN
Status: DISCONTINUED | OUTPATIENT
Start: 2017-12-19 | End: 2017-12-22 | Stop reason: HOSPADM

## 2017-12-19 RX ORDER — HYDROCODONE BITARTRATE AND ACETAMINOPHEN 7.5; 325 MG/1; MG/1
1 TABLET ORAL EVERY 4 HOURS PRN
Status: DISCONTINUED | OUTPATIENT
Start: 2017-12-19 | End: 2017-12-22 | Stop reason: HOSPADM

## 2017-12-19 RX ORDER — ACETAMINOPHEN 325 MG/1
650 TABLET ORAL EVERY 4 HOURS PRN
Status: DISCONTINUED | OUTPATIENT
Start: 2017-12-19 | End: 2017-12-19 | Stop reason: HOSPADM

## 2017-12-19 RX ORDER — HYDROMORPHONE HYDROCHLORIDE 1 MG/ML
0.5 INJECTION, SOLUTION INTRAMUSCULAR; INTRAVENOUS; SUBCUTANEOUS
Status: COMPLETED | OUTPATIENT
Start: 2017-12-19 | End: 2017-12-19

## 2017-12-19 RX ORDER — HYDROCODONE BITARTRATE AND ACETAMINOPHEN 10; 325 MG/1; MG/1
1 TABLET ORAL EVERY 4 HOURS PRN
Status: DISCONTINUED | OUTPATIENT
Start: 2017-12-19 | End: 2017-12-22 | Stop reason: HOSPADM

## 2017-12-19 RX ORDER — METOCLOPRAMIDE HYDROCHLORIDE 5 MG/ML
10 INJECTION INTRAMUSCULAR; INTRAVENOUS ONCE AS NEEDED
Status: DISCONTINUED | OUTPATIENT
Start: 2017-12-19 | End: 2017-12-19 | Stop reason: HOSPADM

## 2017-12-19 RX ORDER — PROMETHAZINE HYDROCHLORIDE 25 MG/1
25 TABLET ORAL EVERY 6 HOURS PRN
Status: DISCONTINUED | OUTPATIENT
Start: 2017-12-19 | End: 2017-12-22 | Stop reason: HOSPADM

## 2017-12-19 RX ORDER — MORPHINE SULFATE 0.5 MG/ML
INJECTION, SOLUTION EPIDURAL; INTRATHECAL; INTRAVENOUS AS NEEDED
Status: DISCONTINUED | OUTPATIENT
Start: 2017-12-18 | End: 2017-12-19 | Stop reason: SURG

## 2017-12-19 RX ORDER — MISOPROSTOL 200 UG/1
800 TABLET ORAL AS NEEDED
Status: DISCONTINUED | OUTPATIENT
Start: 2017-12-19 | End: 2017-12-19 | Stop reason: HOSPADM

## 2017-12-19 RX ORDER — CEFAZOLIN SODIUM 2 G/100ML
2 INJECTION, SOLUTION INTRAVENOUS EVERY 8 HOURS
Status: COMPLETED | OUTPATIENT
Start: 2017-12-19 | End: 2017-12-19

## 2017-12-19 RX ORDER — NALOXONE HCL 0.4 MG/ML
0.1 VIAL (ML) INJECTION
Status: DISCONTINUED | OUTPATIENT
Start: 2017-12-19 | End: 2017-12-22 | Stop reason: HOSPADM

## 2017-12-19 RX ORDER — OXYTOCIN/RINGER'S LACTATE 20/1000 ML
125 PLASTIC BAG, INJECTION (ML) INTRAVENOUS CONTINUOUS PRN
Status: DISPENSED | OUTPATIENT
Start: 2017-12-19 | End: 2017-12-20

## 2017-12-19 RX ORDER — ONDANSETRON 4 MG/1
4 TABLET, FILM COATED ORAL EVERY 6 HOURS PRN
Status: DISCONTINUED | OUTPATIENT
Start: 2017-12-19 | End: 2017-12-22 | Stop reason: HOSPADM

## 2017-12-19 RX ORDER — ACYCLOVIR 200 MG/1
400 CAPSULE ORAL EVERY 6 HOURS SCHEDULED
Status: DISCONTINUED | OUTPATIENT
Start: 2017-12-20 | End: 2017-12-22 | Stop reason: HOSPADM

## 2017-12-19 RX ORDER — PRENATAL VIT/IRON FUM/FOLIC AC 27MG-0.8MG
1 TABLET ORAL DAILY
Status: DISCONTINUED | OUTPATIENT
Start: 2017-12-19 | End: 2017-12-22 | Stop reason: HOSPADM

## 2017-12-19 RX ORDER — ZOLPIDEM TARTRATE 5 MG/1
5 TABLET ORAL NIGHTLY PRN
Status: DISCONTINUED | OUTPATIENT
Start: 2017-12-19 | End: 2017-12-19 | Stop reason: SDUPTHER

## 2017-12-19 RX ORDER — IBUPROFEN 600 MG/1
600 TABLET ORAL EVERY 6 HOURS PRN
Status: DISCONTINUED | OUTPATIENT
Start: 2017-12-19 | End: 2017-12-19 | Stop reason: HOSPADM

## 2017-12-19 RX ORDER — NALOXONE HCL 0.4 MG/ML
0.4 VIAL (ML) INJECTION
Status: ACTIVE | OUTPATIENT
Start: 2017-12-19 | End: 2017-12-20

## 2017-12-19 RX ORDER — MORPHINE SULFATE 2 MG/ML
2 INJECTION, SOLUTION INTRAMUSCULAR; INTRAVENOUS
Status: DISCONTINUED | OUTPATIENT
Start: 2017-12-19 | End: 2017-12-19 | Stop reason: HOSPADM

## 2017-12-19 RX ORDER — OXYTOCIN/RINGER'S LACTATE 20/1000 ML
999 PLASTIC BAG, INJECTION (ML) INTRAVENOUS ONCE
Status: DISCONTINUED | OUTPATIENT
Start: 2017-12-19 | End: 2017-12-22 | Stop reason: HOSPADM

## 2017-12-19 RX ORDER — OXYCODONE HYDROCHLORIDE AND ACETAMINOPHEN 5; 325 MG/1; MG/1
1 TABLET ORAL EVERY 4 HOURS PRN
Status: DISCONTINUED | OUTPATIENT
Start: 2017-12-19 | End: 2017-12-19 | Stop reason: HOSPADM

## 2017-12-19 RX ORDER — KETAMINE HCL IN 0.9 % NACL 50 MG/5 ML
SYRINGE (ML) INTRAVENOUS AS NEEDED
Status: DISCONTINUED | OUTPATIENT
Start: 2017-12-18 | End: 2017-12-19 | Stop reason: SURG

## 2017-12-19 RX ORDER — ACETAMINOPHEN 325 MG/1
650 TABLET ORAL EVERY 4 HOURS PRN
Status: DISCONTINUED | OUTPATIENT
Start: 2017-12-19 | End: 2017-12-22 | Stop reason: HOSPADM

## 2017-12-19 RX ORDER — DIPHENHYDRAMINE HYDROCHLORIDE 50 MG/ML
25 INJECTION INTRAMUSCULAR; INTRAVENOUS NIGHTLY PRN
Status: DISCONTINUED | OUTPATIENT
Start: 2017-12-19 | End: 2017-12-19 | Stop reason: SDUPTHER

## 2017-12-19 RX ORDER — IBUPROFEN 600 MG/1
600 TABLET ORAL ONCE AS NEEDED
Status: DISCONTINUED | OUTPATIENT
Start: 2017-12-19 | End: 2017-12-19 | Stop reason: HOSPADM

## 2017-12-19 RX ORDER — PROMETHAZINE HYDROCHLORIDE 12.5 MG/1
12.5 SUPPOSITORY RECTAL EVERY 6 HOURS PRN
Status: DISCONTINUED | OUTPATIENT
Start: 2017-12-19 | End: 2017-12-22 | Stop reason: HOSPADM

## 2017-12-19 RX ORDER — IBUPROFEN 800 MG/1
800 TABLET ORAL EVERY 8 HOURS SCHEDULED
Status: DISCONTINUED | OUTPATIENT
Start: 2017-12-19 | End: 2017-12-22 | Stop reason: HOSPADM

## 2017-12-19 RX ORDER — DIPHENHYDRAMINE HCL 25 MG
25 CAPSULE ORAL NIGHTLY PRN
Status: DISCONTINUED | OUTPATIENT
Start: 2017-12-19 | End: 2017-12-19 | Stop reason: SDUPTHER

## 2017-12-19 RX ORDER — METHYLERGONOVINE MALEATE 0.2 MG/ML
200 INJECTION INTRAVENOUS ONCE AS NEEDED
Status: DISCONTINUED | OUTPATIENT
Start: 2017-12-19 | End: 2017-12-19 | Stop reason: HOSPADM

## 2017-12-19 RX ORDER — LANOLIN 100 %
OINTMENT (GRAM) TOPICAL
Status: DISCONTINUED | OUTPATIENT
Start: 2017-12-19 | End: 2017-12-22 | Stop reason: HOSPADM

## 2017-12-19 RX ORDER — OXYTOCIN 10 [USP'U]/ML
INJECTION, SOLUTION INTRAMUSCULAR; INTRAVENOUS AS NEEDED
Status: DISCONTINUED | OUTPATIENT
Start: 2017-12-18 | End: 2017-12-19 | Stop reason: SURG

## 2017-12-19 RX ORDER — ONDANSETRON 2 MG/ML
4 INJECTION INTRAMUSCULAR; INTRAVENOUS EVERY 6 HOURS PRN
Status: DISCONTINUED | OUTPATIENT
Start: 2017-12-19 | End: 2017-12-22 | Stop reason: HOSPADM

## 2017-12-19 RX ORDER — KETOROLAC TROMETHAMINE 30 MG/ML
30 INJECTION, SOLUTION INTRAMUSCULAR; INTRAVENOUS EVERY 6 HOURS
Status: DISPENSED | OUTPATIENT
Start: 2017-12-19 | End: 2017-12-20

## 2017-12-19 RX ORDER — PROMETHAZINE HYDROCHLORIDE 12.5 MG/1
12.5 SUPPOSITORY RECTAL EVERY 6 HOURS PRN
Status: DISCONTINUED | OUTPATIENT
Start: 2017-12-19 | End: 2017-12-19 | Stop reason: HOSPADM

## 2017-12-19 RX ORDER — ONDANSETRON 2 MG/ML
4 INJECTION INTRAMUSCULAR; INTRAVENOUS ONCE
Status: DISCONTINUED | OUTPATIENT
Start: 2017-12-19 | End: 2017-12-19 | Stop reason: HOSPADM

## 2017-12-19 RX ADMIN — ACYCLOVIR 400 MG: 200 CAPSULE ORAL at 23:50

## 2017-12-19 RX ADMIN — PRENATAL VIT W/ FE FUMARATE-FA TAB 27-0.8 MG 1 TABLET: 27-0.8 TAB at 09:05

## 2017-12-19 RX ADMIN — KETAMINE HCL-NACL SOLN PREF SY 50 MG/5ML-0.9% (10MG/ML) 25 MG: 10 SOLUTION PREFILLED SYRINGE at 00:15

## 2017-12-19 RX ADMIN — SIMETHICONE CHEW TAB 80 MG 80 MG: 80 TABLET ORAL at 22:07

## 2017-12-19 RX ADMIN — HYDROCODONE BITARTRATE AND ACETAMINOPHEN 1 TABLET: 7.5; 325 TABLET ORAL at 17:01

## 2017-12-19 RX ADMIN — HYDROMORPHONE HYDROCHLORIDE 0.5 MG: 1 INJECTION, SOLUTION INTRAMUSCULAR; INTRAVENOUS; SUBCUTANEOUS at 01:14

## 2017-12-19 RX ADMIN — CEFAZOLIN SODIUM 2 G: 2 INJECTION, SOLUTION INTRAVENOUS at 05:54

## 2017-12-19 RX ADMIN — KETOROLAC TROMETHAMINE 30 MG: 30 INJECTION, SOLUTION INTRAMUSCULAR at 14:32

## 2017-12-19 RX ADMIN — DOCUSATE SODIUM 100 MG: 100 CAPSULE, LIQUID FILLED ORAL at 09:05

## 2017-12-19 RX ADMIN — SODIUM CHLORIDE, POTASSIUM CHLORIDE, SODIUM LACTATE AND CALCIUM CHLORIDE 125 ML/HR: 600; 310; 30; 20 INJECTION, SOLUTION INTRAVENOUS at 11:42

## 2017-12-19 RX ADMIN — OXYCODONE AND ACETAMINOPHEN 2 TABLET: 5; 325 TABLET ORAL at 01:38

## 2017-12-19 RX ADMIN — NICOTINE 1 PATCH: 14 PATCH TRANSDERMAL at 09:05

## 2017-12-19 RX ADMIN — KETOROLAC TROMETHAMINE 30 MG: 30 INJECTION, SOLUTION INTRAMUSCULAR at 09:05

## 2017-12-19 RX ADMIN — CEFAZOLIN SODIUM 2 G: 2 INJECTION, SOLUTION INTRAVENOUS at 19:39

## 2017-12-19 RX ADMIN — HYDROCODONE BITARTRATE AND ACETAMINOPHEN 1 TABLET: 7.5; 325 TABLET ORAL at 05:52

## 2017-12-19 RX ADMIN — HYDROCODONE BITARTRATE AND ACETAMINOPHEN 1 TABLET: 10; 325 TABLET ORAL at 22:07

## 2017-12-19 RX ADMIN — HYDROMORPHONE HYDROCHLORIDE 0.5 MG: 1 INJECTION, SOLUTION INTRAMUSCULAR; INTRAVENOUS; SUBCUTANEOUS at 01:47

## 2017-12-19 RX ADMIN — CEFAZOLIN SODIUM 2 G: 2 INJECTION, SOLUTION INTRAVENOUS at 11:42

## 2017-12-19 RX ADMIN — OXYTOCIN 40 UNITS: 10 INJECTION, SOLUTION INTRAMUSCULAR; INTRAVENOUS at 00:25

## 2017-12-19 RX ADMIN — IBUPROFEN 800 MG: 800 TABLET ORAL at 23:50

## 2017-12-19 NOTE — PLAN OF CARE
Problem: Pregnancy, Multiple Gestation (Adult,Obstetrics,Pediatric)  Goal: Signs and Symptoms of Listed Potential Problems Will be Absent or Manageable (Pregnancy, Multiple Gestation)  Outcome: Ongoing (interventions implemented as appropriate)    12/18/17 1500   Pregnancy, Multiple Gestation   Problems Assessed (Multiple Gestation Pregnancy) all

## 2017-12-19 NOTE — LACTATION NOTE
17 1100   Maternal Information   Date of Referral 17   Person Making Referral other (see comments)  (courtesy)   Infant Reason for Referral 35-37 weeks gestation   Maternal Infant Assessment   Size Issue, Bilateral Breasts no   Shape, Bilateral Breasts round   Density, Bilateral Breasts soft   Nipples, Bilateral everted   Nipple Conditions, Bilateral intact   Maternal Infant Feeding   Previous Breastfeeding History no   Current Delivery Breastfeeding History   Currently Breastfeeding no  (initiated on pump)   Equipment Type/Education   Breast Pump Type double electric, hospital grade   Breast Pump Flange Type hard   Breast Pump Flange Size 24 mm    Breastfeeding   Breast Pumping Interventions post-feed pumping encouraged

## 2017-12-19 NOTE — ANESTHESIA PROCEDURE NOTES
Labor Epidural    Patient location during procedure: OB  Start Time: 12/18/2017 7:36 PM  Performed By  Anesthesiologist: CL TRUONG  Preanesthetic Checklist  Completed: patient identified, site marked, surgical consent, pre-op evaluation, timeout performed, risks and benefits discussed and monitors and equipment checked  Prep:  Pt Position:sitting  Sterile Tech:cap, gloves, mask and sterile barrier  Prep:alcohol swabs  Monitoring:blood pressure monitoring  Epidural Block Procedure:  Approach:midline  Guidance:landmark technique  Location:L3-L4  Needle Type:Tuohy  Needle Gauge:17 G  Loss of Resistance Medium: air  Loss of Resistance: 5cm  Cath Depth at skin:10 cm  Paresthesia: none  Aspiration:negative  Test Dose:negative  Number of Attempts: 2  Post Assessment:  Dressing:occlusive dressing applied and secured with tape  Pt Tolerance:patient tolerated the procedure well with no apparent complications  Complications:no

## 2017-12-19 NOTE — OP NOTE
BH Atiya Brambila  : 1980  MRN: [id number]  CSN: [id number]     Section Operative Note    Pre-Operative Dx:   1. Intrauterine pregnancy at 35w4d weeks   2. cord prolapse      Postoperative dx:    1. Intrauterine pregnancy at 35w4d weeks 2.   cord prolapse        Procedure: Primary  (LTCS)  - 1 layer closure       Surgeon: Miguelito Powell MD   Assistant: Jerrod Garza MD       Anesthesia:    Annmarie BrambilaPasculagriffin MARTINEZ [[id number]]   Epidural     Vern Brambila [[id number]]             EBL: 700 mls.   IV Fluids: 1400 mls.   UOP: 300 mls.     Antibiotics: Ancef 2 g     Infant:      Name:  unknown      Gender:    Vern Brambila [[id number]]   male     Vern Brambila [[id number]]   male   infant    Weight:    Vern Brambila [[id number]]   2690 g (5 lb 14.9 oz)     Vern Brambila [[id number]]   2720 g (5 lb 15.9 oz)      Apgars:    Vern Brambila [[id number]]   8      Vern Brambila [[id number]]   5    @ 1 minute /      Vern Brambila [[id number]]   9      Vern Brambila [[id number]]   8    @ 5 minutes    Cord gases: Venous:  @BABYNOHDR(BRIEFLAB, PHCVEN, BECVEN)@     Arterial:  @BABYNOHDR(BRIEFLAB, PHCART, BECART)@     Procedure Details:   After the patient was adequately anesthetized, she was sterilely prepped and draped in the dorsal supine left lateral tilt position. A Pfannenstiel incision was created sharply with the knife. It was carried down to the fascia with the Bovie.  The fascia was cut transversely with the knife and extended in a curvilinear fashion with scissors. The fascia was freed from its midline insertion superiorly and inferiorly. Rectus muscles were  in the midline. The peritoneum was sharply entered and a bladder flap was sharply created. The lower uterine segment was scored transversely with the knife. Clear amniotic fluid was seen. The infant's head was delivered atraumatically. The mouth and nose were bulb  suctioned. The infant was handed to the delivery team which was in attendance. The placenta was spontaneously extracted. The uterus was exteriorized and wiped free of debris and clot. The uterine incision was closed with #0 Monocryl in a continuous running locking fashion. A second imbricating stitch of #0 Monocryl was also used. The bladder flap was reapproximated.     The uterus was returned to the abdomen. The paracolic gutters were cleared of debris and clot. Parietal peritoneum and posterior rectus sheath were closed with 3-0 Monocryl. The fascia was closed with 0 PDS sewing from either corner, tying in the midline and burying all knots. The subcutaneous tissue was copiously irrigated. Golden's fascia was closed with 3-0 Vicryl and the skin was closed with staples.  All counts were correct.       Complications:   None      Disposition:   Mother to Mother Baby/Postpartum  in stable condition currently.   Baby to NICU  in stable condition currently.       Miguelito Powell MD  12/19/2017  1:16 AM

## 2017-12-19 NOTE — L&D DELIVERY NOTE
[facility]  Vaginal Delivery Note    Delivery     Delivery:    KostaVern [[id number]]   Vaginal, Spontaneous Delivery     Vern Brambila [[id number]]          Date of Birth:     Viviane BrambilaErika MARTINEZ [[id number]]   12/18/2016     Vern Brambila [[id number]]   12/18/2016     Time of Birth:    Vern Brambila [[id number]]   10:19 PM     Vern Brambila [[id number]]   11:57 PM       Anesthesia:    Vern Brambila [[id number]]   Epidural     Vern Brambila [[id number]]          Delivering clinician:    Vern Brambila [[id number]]   Vern Pearson [[id number]]   Miguelito Powell     Forceps?   No   Vacuum? No    Shoulder dystocia present: No        Delivery narrative:  Vaginal delivery of viable male infant over an intact perineum.    Infant    Findings:    Vern Brambila [[id number]]   male     Vern Brambila [[id number]]   male   infant     Infant observations: Weight:      Vern Brambila [[id number]]   2690 g (5 lb 14.9 oz)     Vern Brambila [[id number]]   2720 g (5 lb 15.9 oz)    Length:      Vern Brambila [[id number]]   19     Vern Brambila [[id number]]   19.25   in  Observations/Comments:       Vern Brambila [[id number]]         Vern Brambila [[id number]]           Apgars:    Vern Brambila [[id number]]   8      Vern Brambila [[id number]]   5    @ 1 minute /       Vern Brambila [[id number]]   9      Vern Brambila [[id number]]   8    @ 5 minutes   Infant Name: unknown     Placenta, Cord, and Fluid    Placenta delivered     Vern Brambila [[id number]]         Vern Brambila [[id number]]       at        Vern Brambila [[id number]]         Vern Brambila [[id number]]          Cord:    Vern Brambila [[id number]]   3 vessels     Vern Brambila [[id number]]       present.   Nuchal Cord?  no   Cord blood obtained:    Vern Brambila [[id number]]    Yes     Vern Brambila [[id number]]         Cord gases obtained:     Vern Brambila [[id number]]   Yes     Vern Brambila [[id number]]         Cord gas results: Venous:    Lab Results   Component Value Date    PHCVEN 7.339 12/19/2017       Arterial:    Lab Results   Component Value Date    PHCART 7.23 12/19/2017        Repair    Episiotomy:    Vern Brambila [[id number]]   None     Vern Brambila [[id number]]   Not recorded     Lacerations: No   Estimated Blood Loss:  250 ml     Suture used for repair: n/a     Complications  none    Disposition  Mother to Mother Baby/Postpartum  in stable condition currently.  Baby to NICU  in stable condition currently.      Miguelito Powell MD  12/19/17  1:04 AM

## 2017-12-19 NOTE — PLAN OF CARE
Problem: Patient Care Overview (Adult)  Goal: Plan of Care Review  Outcome: Ongoing (interventions implemented as appropriate)    Problem: Breastfeeding (Adult,NICU,[place],Obstetrics,Pediatric)  Goal: Signs and Symptoms of Listed Potential Problems Will be Absent or Manageable (Breastfeeding)  Outcome: Ongoing (interventions implemented as appropriate)

## 2017-12-19 NOTE — ANESTHESIA POSTPROCEDURE EVALUATION
Patient: Viviane Brambila    Procedure Summary     Date Anesthesia Start Anesthesia Stop Room / Location    17 0054 (17) [facility] /  [facility]       Procedure Diagnosis Surgeon Provider     SECTION PRIMARY (N/A Abdomen) No diagnosis on file. MD Dago Hanley MD          Anesthesia Type: epidural  Last vitals  BP   133/87 (170)   Temp   98.2 °F (36.8 °C) (17)   Pulse   89 (17)   Resp   16 (17)     SpO2   94 % (17)     Post Anesthesia Care and Evaluation    Patient location during evaluation: bedside  Patient participation: complete - patient participated  Level of consciousness: awake and alert  Pain score: 0  Pain management: adequate  Airway patency: patent  Anesthetic complications: No anesthetic complications    Cardiovascular status: acceptable  Respiratory status: acceptable  Hydration status: acceptable

## 2017-12-19 NOTE — ANESTHESIA PREPROCEDURE EVALUATION
Anesthesia Evaluation     Patient summary reviewed and Nursing notes reviewed          Airway   Mallampati: I  TM distance: <3 FB  Neck ROM: full  no difficulty expected  Dental - normal exam     Pulmonary - normal exam   (+) recent URI,   Cardiovascular - negative cardio ROS and normal exam        Neuro/Psych- negative ROS  GI/Hepatic/Renal/Endo    (+)  hepatitis B and C, liver disease,     Musculoskeletal (-) negative ROS    Abdominal  - normal exam    Bowel sounds: normal.   Substance History - negative use     OB/GYN    (+) Pregnant,         Other                                      Anesthesia Plan    ASA 2 - emergent     epidural     Anesthetic plan and risks discussed with patient.  Use of blood products discussed with patient .   Plan discussed with attending.

## 2017-12-20 LAB
CYTO UR: NORMAL
HCT VFR BLD AUTO: 24.6 % (ref 34.5–44)
HGB BLD-MCNC: 8.2 G/DL (ref 11.5–15.5)
LAB AP CASE REPORT: NORMAL
LAB AP CLINICAL INFORMATION: NORMAL
Lab: NORMAL
PATH REPORT.FINAL DX SPEC: NORMAL
PATH REPORT.GROSS SPEC: NORMAL

## 2017-12-20 PROCEDURE — 85018 HEMOGLOBIN: CPT | Performed by: OBSTETRICS & GYNECOLOGY

## 2017-12-20 PROCEDURE — 99231 SBSQ HOSP IP/OBS SF/LOW 25: CPT | Performed by: OBSTETRICS & GYNECOLOGY

## 2017-12-20 PROCEDURE — 85014 HEMATOCRIT: CPT | Performed by: OBSTETRICS & GYNECOLOGY

## 2017-12-20 RX ADMIN — HYDROCODONE BITARTRATE AND ACETAMINOPHEN 1 TABLET: 10; 325 TABLET ORAL at 13:15

## 2017-12-20 RX ADMIN — HYDROCODONE BITARTRATE AND ACETAMINOPHEN 1 TABLET: 10; 325 TABLET ORAL at 17:20

## 2017-12-20 RX ADMIN — HYDROCODONE BITARTRATE AND ACETAMINOPHEN 1 TABLET: 10; 325 TABLET ORAL at 21:34

## 2017-12-20 RX ADMIN — IBUPROFEN 800 MG: 800 TABLET ORAL at 23:26

## 2017-12-20 RX ADMIN — DOCUSATE SODIUM 100 MG: 100 CAPSULE, LIQUID FILLED ORAL at 17:20

## 2017-12-20 RX ADMIN — IBUPROFEN 800 MG: 800 TABLET ORAL at 09:21

## 2017-12-20 RX ADMIN — DOCUSATE SODIUM 100 MG: 100 CAPSULE, LIQUID FILLED ORAL at 09:21

## 2017-12-20 RX ADMIN — ACYCLOVIR 400 MG: 200 CAPSULE ORAL at 23:26

## 2017-12-20 RX ADMIN — SIMETHICONE CHEW TAB 80 MG 80 MG: 80 TABLET ORAL at 09:21

## 2017-12-20 RX ADMIN — PRENATAL VIT W/ FE FUMARATE-FA TAB 27-0.8 MG 1 TABLET: 27-0.8 TAB at 09:21

## 2017-12-20 RX ADMIN — ACYCLOVIR 400 MG: 200 CAPSULE ORAL at 19:13

## 2017-12-20 RX ADMIN — SIMETHICONE CHEW TAB 80 MG 80 MG: 80 TABLET ORAL at 13:15

## 2017-12-20 RX ADMIN — ACYCLOVIR 400 MG: 200 CAPSULE ORAL at 13:15

## 2017-12-20 RX ADMIN — ACYCLOVIR 400 MG: 200 CAPSULE ORAL at 05:29

## 2017-12-20 RX ADMIN — HYDROCODONE BITARTRATE AND ACETAMINOPHEN 1 TABLET: 10; 325 TABLET ORAL at 04:37

## 2017-12-20 RX ADMIN — SIMETHICONE CHEW TAB 80 MG 80 MG: 80 TABLET ORAL at 21:34

## 2017-12-20 RX ADMIN — IBUPROFEN 800 MG: 800 TABLET ORAL at 17:20

## 2017-12-20 RX ADMIN — HYDROCODONE BITARTRATE AND ACETAMINOPHEN 1 TABLET: 10; 325 TABLET ORAL at 09:21

## 2017-12-20 NOTE — PROGRESS NOTES
Atiya Brambila  : 1980  MRN: [id number]  CSN: [id number]    Post-operative Day #1  Subjective   Her pain is well controlled. Vaginal bleeding is normal in amount. She is ambulating without difficulty. She is passing gas. She is voiding without difficulty. Her babies are doing well.     Objective     Min/max vitals past 24 hours:   Temp  Min: 97.5 °F (36.4 °C)  Max: 98.4 °F (36.9 °C)  BP  Min: 107/58  Max: 145/80  Pulse  Min: 82  Max: 109  Resp  Min: 14  Max: 18        General: well developed; well nourished  no acute distress   Abdomen: soft, non-tender; no masses  no umbilical or inginual hernias are present  no hepato-splenomegaly  incision is covered by bandage and healing   Pelvic: Not performed   Ext: Calves NT     Last 3 values     Results from last 7 days  Lab Units 17  0709 17  1525   WBC 10*3/mm3 20.07* 20.00*   HEMOGLOBIN g/dL 11.1* 11.8   HEMATOCRIT % 33.0* 34.6   PLATELETS 10*3/mm3 178 151     Lab Results   Component Value Date    RH Positive 2017    HEPBSAG Negative 2017          Assessment   1. POD #1 S/P Primary  (LTCS)     Plan   1. Continue routine post-operative care  2. Ambulate  3. Advance diet    Miguelito Powell MD  2017  11:00 PM

## 2017-12-20 NOTE — PLAN OF CARE
Problem: Patient Care Overview (Adult)  Goal: Plan of Care Review  Outcome: Ongoing (interventions implemented as appropriate)    Goal: Adult Individualization and Mutuality  Outcome: Ongoing (interventions implemented as appropriate)    Goal: Discharge Needs Assessment  Outcome: Ongoing (interventions implemented as appropriate)      Problem: Breastfeeding (Adult,NICU,,Obstetrics,Pediatric)  Goal: Signs and Symptoms of Listed Potential Problems Will be Absent or Manageable (Breastfeeding)  Outcome: Ongoing (interventions implemented as appropriate)

## 2017-12-21 PROBLEM — O34.33 PREMATURE CERVICAL DILATION IN THIRD TRIMESTER: Status: RESOLVED | Noted: 2017-12-18 | Resolved: 2017-12-21

## 2017-12-21 PROBLEM — O30.003 TWIN GESTATION IN THIRD TRIMESTER: Status: RESOLVED | Noted: 2017-12-17 | Resolved: 2017-12-21

## 2017-12-21 LAB
RUBV IGG SER QL: NORMAL
RUBV IGG SER-ACNC: 20.6 IU/ML

## 2017-12-21 PROCEDURE — 86592 SYPHILIS TEST NON-TREP QUAL: CPT | Performed by: OBSTETRICS & GYNECOLOGY

## 2017-12-21 PROCEDURE — 86762 RUBELLA ANTIBODY: CPT | Performed by: OBSTETRICS & GYNECOLOGY

## 2017-12-21 PROCEDURE — 99238 HOSP IP/OBS DSCHRG MGMT 30/<: CPT | Performed by: OBSTETRICS & GYNECOLOGY

## 2017-12-21 RX ORDER — LANOLIN 100 %
OINTMENT (GRAM) TOPICAL
Qty: 40 G | Refills: 3 | Status: SHIPPED | OUTPATIENT
Start: 2017-12-21 | End: 2018-01-08

## 2017-12-21 RX ORDER — NICOTINE 21 MG/24HR
1 PATCH, TRANSDERMAL 24 HOURS TRANSDERMAL
Qty: 21 PATCH | Refills: 3 | Status: SHIPPED | OUTPATIENT
Start: 2017-12-21 | End: 2019-05-10

## 2017-12-21 RX ORDER — PSEUDOEPHEDRINE HCL 30 MG
100 TABLET ORAL 2 TIMES DAILY PRN
Qty: 60 CAPSULE | Refills: 3 | Status: SHIPPED | OUTPATIENT
Start: 2017-12-21 | End: 2018-01-08

## 2017-12-21 RX ORDER — HYDROCODONE BITARTRATE AND IBUPROFEN 7.5; 2 MG/1; MG/1
1 TABLET, FILM COATED ORAL EVERY 6 HOURS PRN
Qty: 30 TABLET | Refills: 0 | Status: SHIPPED | OUTPATIENT
Start: 2017-12-21 | End: 2018-01-08

## 2017-12-21 RX ADMIN — HYDROCODONE BITARTRATE AND ACETAMINOPHEN 1 TABLET: 10; 325 TABLET ORAL at 09:48

## 2017-12-21 RX ADMIN — PRENATAL VIT W/ FE FUMARATE-FA TAB 27-0.8 MG 1 TABLET: 27-0.8 TAB at 09:48

## 2017-12-21 RX ADMIN — HYDROCODONE BITARTRATE AND ACETAMINOPHEN 1 TABLET: 10; 325 TABLET ORAL at 15:16

## 2017-12-21 RX ADMIN — HYDROCODONE BITARTRATE AND ACETAMINOPHEN 1 TABLET: 10; 325 TABLET ORAL at 20:03

## 2017-12-21 RX ADMIN — ACYCLOVIR 400 MG: 200 CAPSULE ORAL at 19:26

## 2017-12-21 RX ADMIN — ACYCLOVIR 400 MG: 200 CAPSULE ORAL at 05:43

## 2017-12-21 RX ADMIN — ACYCLOVIR 400 MG: 200 CAPSULE ORAL at 13:32

## 2017-12-21 RX ADMIN — DOCUSATE SODIUM 100 MG: 100 CAPSULE, LIQUID FILLED ORAL at 09:48

## 2017-12-21 RX ADMIN — HYDROCODONE BITARTRATE AND ACETAMINOPHEN 1 TABLET: 10; 325 TABLET ORAL at 05:43

## 2017-12-21 RX ADMIN — SIMETHICONE CHEW TAB 80 MG 80 MG: 80 TABLET ORAL at 09:48

## 2017-12-21 RX ADMIN — IBUPROFEN 800 MG: 800 TABLET ORAL at 05:43

## 2017-12-21 RX ADMIN — HYDROCODONE BITARTRATE AND ACETAMINOPHEN 1 TABLET: 10; 325 TABLET ORAL at 01:33

## 2017-12-21 RX ADMIN — IBUPROFEN 800 MG: 800 TABLET ORAL at 15:16

## 2017-12-21 NOTE — PROGRESS NOTES
[facility] Atiya Brambila  : 1980  MRN: [id number]  CSN: [id number]    Post-operative Day #2  Subjective   Her pain is well controlled. Vaginal bleeding is normal in amount. She is ambulating without difficulty. She is passing gas. She is voiding without difficulty. Her baby is doing well.     Objective     Min/max vitals past 24 hours:   Temp  Min: 97.9 °F (36.6 °C)  Max: 98.4 °F (36.9 °C)  BP  Min: 118/69  Max: 140/71  Pulse  Min: 84  Max: 99  Resp  Min: 16  Max: 18        General: well developed; well nourished  no acute distress   Abdomen: soft, non-tender; no masses  no umbilical or inginual hernias are present  no hepato-splenomegaly  incision is healing, clean, dry and intact   Pelvic: Not performed   Ext: Calves NT       Results from last 7 days  Lab Units 17  0759 17  0709 17  1525   WBC 10*3/mm3  --  20.07* 20.00*   HEMOGLOBIN g/dL 8.2* 11.1* 11.8   HEMATOCRIT % 24.6* 33.0* 34.6   PLATELETS 10*3/mm3  --  178 151     Lab Results   Component Value Date    RH Positive 2017    HEPBSAG Negative 2017        Assessment   1. POD #2 S/P Primary  (LTCS)  - 2 layer closure     Plan   1. Continue routine post-operative care  2. Ambulate  3. Advance diet    Miguelito Powell MD  2017  12:12 PM

## 2017-12-21 NOTE — DISCHARGE SUMMARY
Discharge Summary     Atiya Brambila  : 1980  MRN: [id number]  CSN: [id number]    Date of Admission: 2017   Date of Discharge:  2017   Delivering Physician: Miguelito Powell MD       Admission Diagnosis: 1. Pregnancy at 35w3d  2. Twin gestation     Discharge Diagnosis: 1. Same as above plus  2. Pregnancy at 35w3d - delivered       Procedures: 1. Primary  (LTCS)  - 2 layer closure     Hospital Course  See the completed operative report for details regarding antepartum course and delivery.    Her post-operative course was unremarkable.  On POD # 4 she felt like she ready ready for D/C.  She was evaluated by Dr. Powell who agreed she was able to be discharged to home.  She had no febrile morbidity. She had normal bowel and bladder function and was hemodynamically stable.  Her wound was healing well without obvious signs of infections.    Infant     Vern Brambila [[id number]]   male     Vern Brambila [[id number]]   male   fetus weighing      Vern Brambila [[id number]]   2690 g (5 lb 14.9 oz)     Vern Brambila [[id number]]   2720 g (5 lb 15.9 oz)    Apgars -       Vren Brambila [[id number]]   8      Vern Brambila [[id number]]   5   @ 1 minute /       Vern Brambila [[id number]]   9      Vern Brambila [[id number]]   8   @ 5 minutes.    Discharge labs  Lab Results   Component Value Date    WBC 20.07 (H) 2017    HGB 8.2 (L) 2017    HCT 24.6 (L) 2017     2017       Discharge Medications   Viviane Brambila   Home Medication Instructions RALF:[id number]    Printed on:17 1217   Medication Information                      docusate sodium 100 MG capsule  Take 100 mg by mouth 2 (Two) Times a Day As Needed for Constipation.             ferrous sulfate 325 (65 FE) MG tablet  Take 1 tablet by mouth Daily With Breakfast.             HYDROcod Polst-CPM Polst ER (TUSSIONEX PENNKINETIC) 10-8 MG/5ML ER  suspension  Take 5 mL by mouth Every 12 (Twelve) Hours As Needed for Cough.             HYDROcodone-ibuprofen (VICOPROFEN) 7.5-200 MG per tablet  Take 1 tablet by mouth Every 6 (Six) Hours As Needed for Moderate Pain .             hydrOXYzine (VISTARIL) 25 MG capsule  Take 1-2 capsules by mouth at bedtime as needed for sleep             lanolin ointment  Apply  topically Every 1 (One) Hour As Needed for Dry Skin (nipple pain).             nicotine (NICODERM CQ) 14 MG/24HR patch  Place 1 patch on the skin Daily.             polyethylene glycol (MIRALAX) powder  take 17GM (DISSOLVED IN A FULL GLASS OF WATER) by mouth once daily             Prenatal Vit-Fe Fumarate-FA (PNV PRENATAL PLUS MULTIVITAMIN) 27-1 MG tablet               raNITIdine (ZANTAC) 150 MG tablet  Take 1 tablet by mouth Every Night.                 Discharge Disposition Home or Self Care   Condition on Discharge: good   Follow-up: 2 weeks with Dr. Andre Powell MD

## 2017-12-21 NOTE — PROGRESS NOTES
[facility] Atiya Brambila  : 1980  MRN: [id number]  CSN: [id number]    Post-operative Day #3  Subjective   Her pain is well controlled. Vaginal bleeding is normal in amount. She is ambulating without difficulty. She is passing gas. She is voiding without difficulty. Her baby is doing well.     Objective     Min/max vitals past 24 hours:   Temp  Min: 97.9 °F (36.6 °C)  Max: 98.4 °F (36.9 °C)  BP  Min: 118/69  Max: 140/71  Pulse  Min: 84  Max: 99  Resp  Min: 16  Max: 18        General: well developed; well nourished  no acute distress   Abdomen: soft, non-tender; no masses  no umbilical or inginual hernias are present  no hepato-splenomegaly  incision is healing, clean, dry and intact   Pelvic: Not performed   Ext: Calves NT       Results from last 7 days  Lab Units 17  0759 17  0709 17  1525   WBC 10*3/mm3  --  20.07* 20.00*   HEMOGLOBIN g/dL 8.2* 11.1* 11.8   HEMATOCRIT % 24.6* 33.0* 34.6   PLATELETS 10*3/mm3  --  178 151     Lab Results   Component Value Date    RH Positive 2017    HEPBSAG Negative 2017        Assessment   1. POD #3 S/P Primary  (LTCS)  - 2 layer closure     Plan   1. Continue routine post-operative care  2. Ambulate  3. Remove staples and place steristrips  4. Discharge to home  5. Advised no tampons or intercourse for 6 weeks.  6. D/C questions all answered  7. Follow-up appointment in 2 week(s)    Miguelito Powell MD  2017  12:12 PM

## 2017-12-21 NOTE — PLAN OF CARE
Problem: Patient Care Overview (Adult)  Goal: Plan of Care Review  Outcome: Ongoing (interventions implemented as appropriate)   12/21/17 0243   Coping/Psychosocial Response Interventions   Plan Of Care Reviewed With patient   Patient Care Overview   Progress improving

## 2017-12-22 VITALS
TEMPERATURE: 98.1 F | RESPIRATION RATE: 16 BRPM | DIASTOLIC BLOOD PRESSURE: 69 MMHG | HEART RATE: 86 BPM | HEIGHT: 66 IN | OXYGEN SATURATION: 95 % | BODY MASS INDEX: 33.91 KG/M2 | SYSTOLIC BLOOD PRESSURE: 123 MMHG | WEIGHT: 211 LBS

## 2017-12-22 LAB — RPR SER QL: NORMAL

## 2017-12-22 RX ADMIN — DOCUSATE SODIUM 100 MG: 100 CAPSULE, LIQUID FILLED ORAL at 09:57

## 2017-12-22 RX ADMIN — HYDROCODONE BITARTRATE AND ACETAMINOPHEN 1 TABLET: 10; 325 TABLET ORAL at 05:21

## 2017-12-22 RX ADMIN — HYDROCODONE BITARTRATE AND ACETAMINOPHEN 1 TABLET: 10; 325 TABLET ORAL at 14:59

## 2017-12-22 RX ADMIN — SIMETHICONE CHEW TAB 80 MG 80 MG: 80 TABLET ORAL at 09:57

## 2017-12-22 RX ADMIN — PRENATAL VIT W/ FE FUMARATE-FA TAB 27-0.8 MG 1 TABLET: 27-0.8 TAB at 09:57

## 2017-12-22 RX ADMIN — IBUPROFEN 800 MG: 800 TABLET ORAL at 09:57

## 2017-12-22 RX ADMIN — ACYCLOVIR 400 MG: 200 CAPSULE ORAL at 15:00

## 2017-12-22 RX ADMIN — ACYCLOVIR 400 MG: 200 CAPSULE ORAL at 00:10

## 2017-12-22 RX ADMIN — HYDROCODONE BITARTRATE AND ACETAMINOPHEN 1 TABLET: 10; 325 TABLET ORAL at 00:10

## 2017-12-22 RX ADMIN — IBUPROFEN 800 MG: 800 TABLET ORAL at 00:10

## 2017-12-22 RX ADMIN — ACYCLOVIR 400 MG: 200 CAPSULE ORAL at 05:20

## 2017-12-22 RX ADMIN — HYDROCODONE BITARTRATE AND ACETAMINOPHEN 1 TABLET: 10; 325 TABLET ORAL at 09:57

## 2017-12-22 NOTE — PLAN OF CARE
Problem: Patient Care Overview (Adult)  Goal: Plan of Care Review  Outcome: Ongoing (interventions implemented as appropriate)   12/22/17 0567   Coping/Psychosocial Response Interventions   Plan Of Care Reviewed With patient   Patient Care Overview   Progress improving

## 2017-12-22 NOTE — PROGRESS NOTES
"Adult Nutrition  Assessment/PES    Patient Name:  Viviane Brambila  YOB: 1980  MRN: [id number]  Admit Date:  12/17/2017    Assessment Date:  12/22/2017    Comments:            Reason for Assessment       12/22/17 1048    Reason for Assessment    Reason For Assessment/Visit identified at risk by screening criteria    Identified At Risk By Screening Criteria other (see comments)   twin pregnancy    Time Spent (min) 15    Obstetrical Diagnosis Pregnancy   admitted at 35w2d with twins    Pulmonary/Critical Care Other (comment)   acute respiratory infection              Nutrition/Diet History       12/22/17 1049    Nutrition/Diet History    Other Unable to speak with patient at time of visit            Anthropometrics       12/22/17 1050    Anthropometrics    Height 167.6 cm (66\")    Weight 95.7 kg (211 lb)   weight at 35w2d with twins    Ideal Body Weight (IBW)    Ideal Body Weight (IBW), Female 59.98    % Ideal Body Weight 159.91    Body Mass Index (BMI)    BMI (kg/m2) 34.13            Labs/Tests/Procedures/Meds       12/22/17 1050    Labs/Tests/Procedures/Meds    Labs/Tests Review Reviewed                Nutrition Prescription Ordered       12/22/17 1050    Nutrition Prescription PO    Current PO Diet Regular            Evaluation of Received Nutrient/Fluid Intake       12/22/17 1050    PO Evaluation    Number of Days PO Intake Evaluated Insufficient Data            Problem/Interventions:        Problem 1       12/22/17 1050    Nutrition Diagnoses Problem 1    Problem 1 No Nutrition Diagnosis at this Time                    Intervention Goal       12/22/17 1050    Intervention Goal    General Nutrition support treatment            Nutrition Intervention       12/22/17 1050    Nutrition Intervention    RD/Tech Action Follow Tx progress;Care plan reviewd              Education/Evaluation       12/22/17 1050    Monitor/Evaluation    Monitor Per protocol;PO intake;Pertinent labs        Electronically signed " by:  Radha Dye, ELLIOTT  12/22/17 10:51 AM

## 2017-12-26 ENCOUNTER — TELEPHONE (OUTPATIENT)
Dept: OBSTETRICS AND GYNECOLOGY | Facility: CLINIC | Age: 37
End: 2017-12-26

## 2017-12-26 NOTE — TELEPHONE ENCOUNTER
Called pt, she is an hour late for work in appointment.  She stated she took the babies to their doctor today and had someone there look at her incision.  She states she was told there is no infection.

## 2017-12-26 NOTE — TELEPHONE ENCOUNTER
Dr joyce pt had c/section and calls to inform that her incision is opened up and has drainage and odor.  appt was made for her to come in to see dr mclaughlin since dr joyce is out of office

## 2018-01-08 ENCOUNTER — OFFICE VISIT (OUTPATIENT)
Dept: OBSTETRICS AND GYNECOLOGY | Facility: CLINIC | Age: 38
End: 2018-01-08

## 2018-01-08 VITALS
BODY MASS INDEX: 29.63 KG/M2 | DIASTOLIC BLOOD PRESSURE: 60 MMHG | HEIGHT: 66 IN | OXYGEN SATURATION: 97 % | RESPIRATION RATE: 14 BRPM | SYSTOLIC BLOOD PRESSURE: 102 MMHG | HEART RATE: 78 BPM | WEIGHT: 184.4 LBS

## 2018-01-08 DIAGNOSIS — Z09 POSTOPERATIVE EXAMINATION: Primary | ICD-10-CM

## 2018-01-08 PROCEDURE — 99024 POSTOP FOLLOW-UP VISIT: CPT | Performed by: OBSTETRICS & GYNECOLOGY

## 2018-01-08 NOTE — PROGRESS NOTES
"Subjective   Chief Complaint   Patient presents with   • Post-op     S/P vaginal and  section: incision check     Viviane Brambila is a 37 y.o. year old  presenting to be seen for her post-operative visit.  Currently she reports no problems with eating, bowel movements, voiding, or wound drainage and pain is well controlled.    The pathology results from her procedure are in Viviane's record and are benign.      OTHER COMPLAINTS:  Nothing else    The following portions of the patient's history were reviewed and updated as appropriate:current medications and allergies       Objective   /60  Pulse 78  Resp 14  Ht 167.6 cm (66\")  Wt 83.6 kg (184 lb 6.4 oz)  LMP 2017 (Approximate)  SpO2 97%  Breastfeeding? No  BMI 29.76 kg/m2    General:  well developed; well nourished  no acute distress   Abdomen: soft, non-tender; no masses  no umbilical or inginual hernias are present  no hepato-splenomegaly  incision is healed, clean, dry and intact   Pelvis: Not performed.          Assessment   1. S/P      Plan   1. Avoid tampons, douching and intercourse  2. OK to drive  3. OK to lift  4. Nothing per vagina still until 6 weeks after her procedure  5. Follow up for 6 week postpartum check 4 week    No orders of the defined types were placed in this encounter.         This note was electronically signed.    Miguelito Powell M.D. SAMUEL  2018  "

## 2018-01-15 ENCOUNTER — TELEPHONE (OUTPATIENT)
Dept: OBSTETRICS AND GYNECOLOGY | Facility: CLINIC | Age: 38
End: 2018-01-15

## 2018-01-15 NOTE — TELEPHONE ENCOUNTER
Andre Kunz with the [facility] called stating pt is trying to nurse but is having milk supply issues and has some over the counter things she can give her but needs Dr. Powell's permission by faxing over a note/letter. Please contact to speak to her in regards to this    [phone]

## 2018-03-16 ENCOUNTER — DOCUMENTATION (OUTPATIENT)
Dept: OBSTETRICS AND GYNECOLOGY | Facility: CLINIC | Age: 38
End: 2018-03-16

## 2018-03-16 ENCOUNTER — POSTPARTUM VISIT (OUTPATIENT)
Dept: OBSTETRICS AND GYNECOLOGY | Facility: CLINIC | Age: 38
End: 2018-03-16

## 2018-03-16 VITALS
SYSTOLIC BLOOD PRESSURE: 116 MMHG | OXYGEN SATURATION: 98 % | WEIGHT: 198 LBS | HEART RATE: 75 BPM | DIASTOLIC BLOOD PRESSURE: 90 MMHG | RESPIRATION RATE: 14 BRPM | BODY MASS INDEX: 31.82 KG/M2 | HEIGHT: 66 IN

## 2018-03-16 DIAGNOSIS — Z30.09 STERILIZATION CONSULT: Primary | ICD-10-CM

## 2018-03-16 DIAGNOSIS — N76.0 ACUTE VAGINITIS: ICD-10-CM

## 2018-03-16 PROCEDURE — 99213 OFFICE O/P EST LOW 20 MIN: CPT | Performed by: OBSTETRICS & GYNECOLOGY

## 2018-03-16 RX ORDER — IPRATROPIUM BROMIDE 42 UG/1
SPRAY, METERED NASAL
Refills: 0 | COMMUNITY
Start: 2018-02-26 | End: 2019-05-10

## 2018-03-16 RX ORDER — METRONIDAZOLE 500 MG/1
500 TABLET ORAL 2 TIMES DAILY
Qty: 10 TABLET | Refills: 0 | Status: SHIPPED | OUTPATIENT
Start: 2018-03-16 | End: 2018-03-19 | Stop reason: SDUPTHER

## 2018-03-16 RX ORDER — IBUPROFEN 800 MG/1
800 TABLET ORAL EVERY 8 HOURS PRN
Qty: 90 TABLET | Refills: 1 | Status: SHIPPED | OUTPATIENT
Start: 2018-03-16 | End: 2020-03-11 | Stop reason: SDDI

## 2018-03-16 NOTE — PROGRESS NOTES
"Subjective   Chief Complaint   Patient presents with   • Follow-up     Yellow vaginal discharge with itching and odor     Viviane Brambila is a 37 y.o. year old  presenting to be seen for her postpartum visit.  She had a vaginal primary  (LTCS) delivery.  Her sons are doing well.    Since delivery she has been sexually active.  She does not have concerns about post-partum blues/depression.   She is bottle feeding.  For ongoing contraception, her plans are Depo-Provera.    The following portions of the patient's history were reviewed and updated as appropriate:current medications and allergies    Smoking status: Current Every Day Smoker                                                   Packs/day: 0.50      Years: 0.00         Types: Cigarettes  Smokeless tobacco: Never Used                          Review of Systems  Constitutional POS: nothing reported    NEG: anorexia or night sweats   Gastointestinal POS: nothing reported    NEG: bloating, change in bowel habits, melena or reflux symptoms   Genitourinary POS: nothing reported    NEG: dysuria or hematuria   Integument POS: nothing reported    NEG: moles that are changing in size, shape, color or rashes   Breast POS: nothing reported    NEG: persistent breast lump, skin dimpling or nipple discharge        Objective   /90   Pulse 75   Resp 14   Ht 167.6 cm (66\")   Wt 89.8 kg (198 lb)   LMP 2018 (Exact Date)   SpO2 98%   Breastfeeding? No   BMI 31.96 kg/m²     General:  well developed; well nourished  no acute distress   Abdomen: soft, non-tender; no masses  no umbilical or inginual hernias are present  no hepato-splenomegaly   Pelvis: Clinical staff was present for exam  External genitalia:  normal appearance of the external genitalia including Bartholin's and Gerster's glands.  :  urethral meatus normal;  Cervix:  cervical motion tenderness is absent; one swab collected  Uterus:  normal size, shape and consistency.  Adnexa:  normal " bimanual exam of the adnexa.          Assessment   1. Normal 6 week postpartum exam  2. vaginitis  3. Contraception management     Plan   1. BC options reviewed and compared today: Depo-Provera and tubal ligation  2. Follow up pre op consult for B salpingectomy     New Medications Ordered This Visit   Medications   • ipratropium (ATROVENT) 0.06 % nasal spray     Sig: instill 2 sprays into each nostril two to three times a day     Refill:  0   • CHANTIX STARTING MONTH YONY 0.5 MG X 11 & 1 MG X 42 tablet     Refill:  0          This note was electronically signed.    MD VICTORIANO LondonoA

## 2018-03-16 NOTE — PROGRESS NOTES
ON CALL    Patient seen today.  Apparently diagnosed with vaginitis  No med called in.  Patient says it was supposed to be Flagyl    Called in Flagyl 500mg PO BID for 5 days

## 2018-03-19 RX ORDER — METRONIDAZOLE 500 MG/1
500 TABLET ORAL 2 TIMES DAILY
Qty: 10 TABLET | Refills: 0 | Status: SHIPPED | OUTPATIENT
Start: 2018-03-19 | End: 2018-03-24

## 2020-03-11 ENCOUNTER — OFFICE VISIT (OUTPATIENT)
Dept: OBSTETRICS AND GYNECOLOGY | Facility: CLINIC | Age: 40
End: 2020-03-11

## 2020-03-11 VITALS
DIASTOLIC BLOOD PRESSURE: 78 MMHG | SYSTOLIC BLOOD PRESSURE: 112 MMHG | HEIGHT: 66 IN | WEIGHT: 194.2 LBS | BODY MASS INDEX: 31.21 KG/M2

## 2020-03-11 DIAGNOSIS — R10.30 LOWER ABDOMINAL PAIN: Primary | ICD-10-CM

## 2020-03-11 DIAGNOSIS — R76.8 HEPATITIS C ANTIBODY POSITIVE IN BLOOD: ICD-10-CM

## 2020-03-11 PROBLEM — O46.93 PREGNANCY WITH THIRD TRIMESTER BLEEDING: Status: RESOLVED | Noted: 2017-11-03 | Resolved: 2020-03-11

## 2020-03-11 PROBLEM — O30.043 DICHORIONIC DIAMNIOTIC TWIN PREGNANCY IN THIRD TRIMESTER: Status: RESOLVED | Noted: 2017-11-28 | Resolved: 2020-03-11

## 2020-03-11 PROBLEM — Z34.90 PREGNANCY: Status: RESOLVED | Noted: 2017-12-07 | Resolved: 2020-03-11

## 2020-03-11 PROCEDURE — 99213 OFFICE O/P EST LOW 20 MIN: CPT | Performed by: OBSTETRICS & GYNECOLOGY

## 2020-03-11 NOTE — PROGRESS NOTES
"Subjective   Chief Complaint   Patient presents with   • Gynecologic Exam     c/o pain \"down low\" does not know if it is her ovaries     Viviane Brambila is a 39 y.o. year old .  Patient's last menstrual period was 2020 (approximate).  She presents to be seen because of lower abdominal pain. Patient reports lower abdominal pain for the last month. Patient describes as constant sharp pain that has occurred intermittently for several days over the last month. Alleviating factors: laying down, rest, Tylenol. Aggravating factors: prolonged standing. Menses occur monthly lasting 5-6 days with normal flow with moderate cramping. Patient is sexually active with one partner. Not using any form of contraception. Bowel movements daily; denies straining or hard stools. Patient has a history of Hepatitis C and possible Hepatitis B that was diagnosed with her last pregnancy. She states she has not followed up regarding this. She has a history of substance abuse and has been clean for 3 years.     OTHER THINGS SHE WANTS TO DISCUSS TODAY:  Nothing else    The following portions of the patient's history were reviewed and updated as appropriate:current medications and allergies    Social History    Tobacco Use      Smoking status: Current Every Day Smoker        Packs/day: 0.50        Types: Cigarettes      Smokeless tobacco: Never Used    Review of Systems  Constitutional POS: nothing reported    NEG: anorexia or night sweats   Genitourinary POS: nothing reported    NEG: dysuria or hematuria   Gastointestinal POS: nothing reported    NEG: bloating, change in bowel habits, melena or reflux symptoms   Integument POS: nothing reported    NEG: moles that are changing in size, shape, color or rashes   Breast POS: nothing reported    NEG: persistent breast lump, skin dimpling or nipple discharge         Objective   /78   Ht 167.6 cm (66\")   Wt 88.1 kg (194 lb 3.2 oz)   LMP 2020 (Approximate)   Breastfeeding No   " BMI 31.34 kg/m²     General:  well developed; well nourished  no acute distress   Skin:  Not performed.   Thyroid: not examined   Lungs:  breathing is unlabored   Heart:  Not performed.   Breasts:  Not performed.   Abdomen: soft, non-tender; no masses  no umbilical or inguinal hernias are present  no hepato-splenomegaly   Pelvis: Clinical staff was present for exam  External genitalia:  normal appearance of the external genitalia including Bartholin's and [place]'s glands.  :  urethral meatus normal;  Vaginal:  normal pink mucosa without prolapse or lesions.  Cervix:  normal appearance.  Uterus:  normal size, shape and consistency.  Adnexa:  normal bimanual exam of the adnexa.     Lab Review   No data reviewed    Imaging   No data reviewed        Assessment   1. Lower Abdominal Pain  2. History of Hepatitis C and possible Hepatitis B     Plan   The following tests were ordered today: STD swabs for GC, chlamydia and trichimoniasis.  It was explained to Viviane that all lab test should be back within the one week after they are performed. She will be notified about the results, regardless of the findings. If she has not been contacted by the office within 2 weeks after the test has been performed, it is her responsibility to contact us to learn about her results.  1. Will obtain ultrasound to rule out any structural GYN etiology for abdominal pain. Counseled patient to keep a pain journal in order to better ascertain possible etiologies.  2. Referral to GI given history of Hepatitis C and possible Hepatitis B.  3. The importance of keeping all planned follow-up and taking all medications as prescribed was emphasized.  4. Follow up after ultrasound.    No orders of the defined types were placed in this encounter.         This note was electronically signed.    Denice Chang DO  March 11, 2020    Note: Speech recognition transcription software may have been used to create portions of this document.  An attempt at  proofreading has been made but errors in transcription could still be present.

## 2020-05-13 ENCOUNTER — TELEPHONE (OUTPATIENT)
Dept: GASTROENTEROLOGY | Facility: CLINIC | Age: 40
End: 2020-05-13

## 2020-05-19 NOTE — TELEPHONE ENCOUNTER
Dr. Powell patient s/p vaginal and  section. C/O decrease breast milk, Dayanara with [facility] wants to know if it is OK for patient to take something over the counter and if so please fax over note stating it is OK for patient to take something over the counter. Fax note to [phone] attn: Dayanara    S Plasty Text: Given the location and shape of the defect, and the orientation of relaxed skin tension lines, an S-plasty was deemed most appropriate for repair.  Using a sterile surgical marker, the appropriate outline of the S-plasty was drawn, incorporating the defect and placing the expected incisions within the relaxed skin tension lines where possible.  The area thus outlined was incised deep to adipose tissue with a #15 scalpel blade.  The skin margins were undermined to an appropriate distance in all directions utilizing iris scissors. The skin flaps were advanced over the defect.  The opposing margins were then approximated with interrupted buried subcutaneous sutures.

## 2020-06-18 ENCOUNTER — OFFICE VISIT (OUTPATIENT)
Dept: OBSTETRICS AND GYNECOLOGY | Facility: CLINIC | Age: 40
End: 2020-06-18

## 2020-06-18 VITALS
WEIGHT: 215.4 LBS | SYSTOLIC BLOOD PRESSURE: 132 MMHG | BODY MASS INDEX: 34.62 KG/M2 | HEIGHT: 66 IN | DIASTOLIC BLOOD PRESSURE: 68 MMHG

## 2020-06-18 DIAGNOSIS — Z01.419 ENCNTR FOR GYN EXAM (GENERAL) (ROUTINE) W/O ABN FINDINGS: Primary | ICD-10-CM

## 2020-06-18 PROCEDURE — 99395 PREV VISIT EST AGE 18-39: CPT | Performed by: OBSTETRICS & GYNECOLOGY

## 2020-06-18 NOTE — PROGRESS NOTES
Subjective   Chief Complaint   Patient presents with   • Follow-up     f/u for u/s results     Viviane Brambila is a 39 y.o. year old  presenting to be seen for her annual exam.     SEXUAL Hx:  She is not currently sexually active.  In the past year there there has been NO new sexual partners.    Condoms are never used.  She would not like to be screened for STD's at today's exam.  Current birth control method: not using any form of contraception and does not wish to get pregnant.  She is not happy with her current method of contraception and does not want to discuss alternative methods of contraception.  MENSTRUAL Hx:  LMP: 2020  In the past 6 months her cycles have been regular, predictable and occur monthly.  Her menstrual flow is typically normal.   Each month on average there are roughly 2 day(s) of very heavy flow.    Intermenstrual bleeding is absent.    Post-coital bleeding is absent.  Dysmenorrhea: none  PMS: none  Her cycles are not a source of concern for her that she wishes to discuss today.  HEALTH Hx:  She exercises regularly: no (and has no plans to become more active).  She wears her seat belt: yes.  She has concerns about domestic violence: no.  OTHER THINGS SHE WANTS TO DISCUSS TODAY:  Patient noticed a lump on her right breast for the last 8 months. Her PCP sent to  for mammogram which was abnormal. She had biopsy which she states was abnormal but was told they only needed to follow up closely. She states imaging and biopsy were performed in March.    Patient reports that she missed her appointment with GI secondary to COVID-19. I gave her the office number so she could call to reschedule. She states her abdominal pain is resolved. She does still need surveillance for Hepatitis.     The following portions of the patient's history were reviewed and updated as appropriate:problem list, current medications, allergies, past family history, past medical history, past social history and past  "surgical history.    Social History    Tobacco Use      Smoking status: Current Every Day Smoker        Packs/day: 0.50        Types: Cigarettes      Smokeless tobacco: Never Used    Review of Systems  Constitutional POS: nothing reported    NEG: anorexia or night sweats   Genitourinary POS: nothing reported    NEG: dysuria or hematuria      Gastointestinal POS: nothing reported    NEG: bloating, change in bowel habits, melena or reflux symptoms   Integument POS: nothing reported    NEG: moles that are changing in size, shape, color or rashes   Breast POS: nothing reported    NEG: persistent breast lump, skin dimpling or nipple discharge        Objective   /68   Ht 167.6 cm (66\")   Wt 97.7 kg (215 lb 6.4 oz)   Breastfeeding No   BMI 34.77 kg/m²     General:  well developed; well nourished  no acute distress   Skin:  No suspicious lesions seen   Thyroid: normal to inspection and palpation   Breasts:  Examined in supine position  There are no palpable axillary nodes  Bilateral implants noted. On right breast, there is a 0.5 cm palpable nodule approximately 3 cm from areola.   Abdomen: soft, non-tender; no masses  no umbilical or inguinal hernias are present  no hepato-splenomegaly   Pelvis: Clinical staff was present for exam  External genitalia:  normal appearance of the external genitalia including Bartholin's and [place]'s glands.  :  urethral meatus normal;  Vaginal:  normal pink mucosa without prolapse or lesions.  Cervix:  normal appearance.  Uterus:  normal size, shape and consistency.  Adnexa:  normal bimanual exam of the adnexa.        Assessment   1. Normal GYN exam  2. Right Breast Nodule -- s/p imaging and biopsy; records unavailable     Plan   Pap and HPV were done today.  If she does not receive the results of the Pap within 2 weeks  time, she was instructed to call to find out the results.  I explained to Viviane that the recommendations for Pap smear interval in a low risk patient's has " lengthened to 5 years time if both cytology and HPV testing were normal.  I encouraged her to be seen yearly for a full physical exam including breast and pelvic exam even during the off years when PAP's will not be performed.  1. Will request records from  regarding breast imaging and biopsy in March. Will base follow up on those results.  2. Discussed contraceptive options. Explained that estrogen containing methods are contraindicated given smoking status and age. She would like to proceed with IUD placement.  3. Encouraged patient call to schedule appointment with GI given hepatitis history.  4. No prescription was given or electronically sent at today's visit  5. The importance of keeping all planned follow-up and taking all medications as prescribed was emphasized.  6. Follow up for annual exam and for IUD placement in 1 year and in 1-2 weeks, respectively.     No orders of the defined types were placed in this encounter.         This note was electronically signed.    Denice Chang, DO  June 18, 2020    Note: Speech recognition transcription software may have been used to create portions of this document.  An attempt at proofreading has been made but errors in transcription could still be present.

## 2020-06-26 ENCOUNTER — TELEPHONE (OUTPATIENT)
Dept: OBSTETRICS AND GYNECOLOGY | Facility: CLINIC | Age: 40
End: 2020-06-26

## 2020-06-30 ENCOUNTER — TELEPHONE (OUTPATIENT)
Dept: OBSTETRICS AND GYNECOLOGY | Facility: CLINIC | Age: 40
End: 2020-06-30

## 2020-10-14 ENCOUNTER — OFFICE VISIT (OUTPATIENT)
Dept: ORTHOPEDIC SURGERY | Facility: CLINIC | Age: 40
End: 2020-10-14

## 2020-10-14 VITALS — WEIGHT: 206.4 LBS | OXYGEN SATURATION: 98 % | BODY MASS INDEX: 33.17 KG/M2 | HEIGHT: 66 IN | HEART RATE: 106 BPM

## 2020-10-14 DIAGNOSIS — M25.571 RIGHT ANKLE PAIN, UNSPECIFIED CHRONICITY: ICD-10-CM

## 2020-10-14 DIAGNOSIS — S93.601A RIGHT FOOT SPRAIN, INITIAL ENCOUNTER: ICD-10-CM

## 2020-10-14 DIAGNOSIS — M79.671 RIGHT FOOT PAIN: Primary | ICD-10-CM

## 2020-10-14 PROCEDURE — 99203 OFFICE O/P NEW LOW 30 MIN: CPT | Performed by: PHYSICIAN ASSISTANT

## 2020-10-14 RX ORDER — IBUPROFEN 800 MG/1
800 TABLET ORAL AS NEEDED
COMMUNITY

## 2020-10-14 NOTE — PROGRESS NOTES
[facility] [place] [facility] Note    Subjective     Patient: Viviane Brambila  : 1980    Primary Care Provider: Provider, No Known    Requesting Provider: As above    Pain of the Right Foot      History    Chief Complaint: Right foot and ankle pain    History of Present Illness: This is a very pleasant 39-year-old female presenting to discuss her right foot and ankle pain since tripping over a baby gate about 6 days ago.  She reports that her foot and ankle got twisted up into the gait.  She has generalized pain.  It is 7/10 and throbbing.  She complains of swelling ecchymosis that has seemed to resolve.  The pain continues.  She has been treating with ibuprofen and activity modification.  She was seen at urgent treatment and walked in today in a splint.  She is here for further evaluation and treatment recommendations.    Current Outpatient Medications on File Prior to Visit   Medication Sig Dispense Refill   • ibuprofen (ADVIL,MOTRIN) 800 MG tablet Take 800 mg by mouth As Needed for Mild Pain .       No current facility-administered medications on file prior to visit.       No Known Allergies   Past Medical History:   Diagnosis Date   •  in first trimester     ELECTIVE   • Drug use    • Hepatitis B    • Hepatitis B antibody positive    • Hepatitis C    • Hepatitis C antibody positive in blood      Past Surgical History:   Procedure Laterality Date   • BREAST AUGMENTATION Bilateral    • BREAST BIOPSY     •  SECTION N/A 2017    Procedure:  SECTION PRIMARY;  Surgeon: Miguelito Powell MD;  Location: [facility] LABOR DELIVERY;  Service:    • INDUCED       ELECTIVE   • WISDOM TOOTH EXTRACTION       Family History   Problem Relation Age of Onset   • Colon cancer Maternal Grandmother    • Breast cancer Mother    • Ovarian cancer Neg Hx    • Uterine cancer Neg Hx    • Endometrial cancer Neg Hx       Social History     Socioeconomic History   • Marital  "status:      Spouse name: Not on file   • Number of children: Not on file   • Years of education: Not on file   • Highest education level: Not on file   Tobacco Use   • Smoking status: Current Every Day Smoker     Packs/day: 0.50     Years: 20.00     Pack years: 10.00     Types: Cigarettes   • Smokeless tobacco: Never Used   Substance and Sexual Activity   • Alcohol use: No   • Drug use: Not Currently     Types: Methamphetamines, Benzodiazepines     Comment: in 2015   • Sexual activity: Not Currently     Partners: Male     Birth control/protection: None        Review of Systems   Constitutional: Negative.    HENT: Negative.    Eyes: Negative.    Respiratory: Negative.    Cardiovascular: Negative.    Gastrointestinal: Negative.    Endocrine: Negative.    Genitourinary: Negative.    Musculoskeletal: Positive for arthralgias and joint swelling.   Skin: Negative.    Allergic/Immunologic: Negative.    Neurological: Negative.    Hematological: Negative.    Psychiatric/Behavioral: Negative.        The following portions of the patient's history were reviewed and updated as appropriate: allergies, current medications, past family history, past medical history, past social history, past surgical history and problem list.      Objective      Physical Exam  Pulse 106   Ht 167.6 cm (65.98\")   Wt 93.6 kg (206 lb 6.4 oz)   SpO2 98%   BMI 33.33 kg/m²     Body mass index is 33.33 kg/m².    GENERAL: Body habitus: obese    Lower extremity edema: Left: trace; Right: trace    Varicose veins:  Left: mild; Right: mild    Gait: antalgic     Mental Status:  awake and alert; oriented to person, place, and time    Voice:  clear  SKIN:  Normal    Hair Growth:  Right:normal; Left:  normal  HEENT: Head: Normocephalic, atraumatic,  without obvious abnormality.  eye: normal external eye, no icterus   PULM:  Repiratory effort normal    Ortho Exam  V:  Dorsalis Pedis:  Right: 2+;     Posterior Tibial: Right:2+;     Capillary Refill:  " Brisk  MSK:      Tibia:  Right:  non tender;    Ankle:  Right: tender around the lateral malleolus and anterior joint line.  No swelling or ecchymosis., ROM  normal and motor function  normal;     Foot:  Right:  tender Tender midly over the dorsum of the foot with no increased tenderness at the Lisfranc joint.   no swelling or ecyhmosis. , ROM  normal and motor function  normal;       NEURO:     [place] 5.07 monofilament test: not evaluated    Lower extremity sensation: intact     Calf Atrophy:none    Motor Function: all 5/5            Medical Decision Making    Data Review:   ordered and reviewed x-rays today    Assessment:  1. Right foot pain    2. Right ankle pain, unspecified chronicity    3. Right foot sprain, initial encounter        Plan:  Right foot and ankle pain after an injury tripping over a baby gate.  I reviewed today's x-rays and clinical findings with the patient.  X-rays show no evidence of acute bony findings.  She does have a bipartite sesamoid, but this is not painful.  I think her pain and functional limitations are secondary to a sprain.  Recommendation today is that she continue with ibuprofen and we will get her a tall walking boot.  She may come out to bathe, drive and sleep.  She will return in 3 weeks for repeat exam or sooner if needed.    Patient history, diagnosis and treatment plan discussed with Dr. Hooper.        Kathie Perez PA-C  10/14/20  13:38 EDT

## 2020-11-04 ENCOUNTER — TELEPHONE (OUTPATIENT)
Dept: ORTHOPEDIC SURGERY | Facility: CLINIC | Age: 40
End: 2020-11-04

## 2020-11-04 NOTE — TELEPHONE ENCOUNTER
CALLED TO RESCHEDULE NO SHOW APPOINTMENT. PATIENT WAS NOT AT HOME. LEFT A MESSAGE TO CALL BACK TO RESCHEDULE.

## 2021-01-20 ENCOUNTER — HOSPITAL ENCOUNTER (EMERGENCY)
Facility: HOSPITAL | Age: 41
Discharge: LEFT BEFORE DISCHARGE | End: 2021-01-20
Attending: EMERGENCY MEDICINE | Admitting: EMERGENCY MEDICINE

## 2021-01-20 ENCOUNTER — TELEPHONE (OUTPATIENT)
Dept: EMERGENCY DEPT | Facility: HOSPITAL | Age: 41
End: 2021-01-20

## 2021-01-20 ENCOUNTER — APPOINTMENT (OUTPATIENT)
Dept: GENERAL RADIOLOGY | Facility: HOSPITAL | Age: 41
End: 2021-01-20

## 2021-01-20 VITALS
WEIGHT: 165 LBS | SYSTOLIC BLOOD PRESSURE: 129 MMHG | HEIGHT: 65 IN | HEART RATE: 84 BPM | BODY MASS INDEX: 27.49 KG/M2 | OXYGEN SATURATION: 98 % | DIASTOLIC BLOOD PRESSURE: 79 MMHG | TEMPERATURE: 96.6 F | RESPIRATION RATE: 16 BRPM

## 2021-01-20 DIAGNOSIS — J18.9 PNEUMONIA DUE TO INFECTIOUS ORGANISM, UNSPECIFIED LATERALITY, UNSPECIFIED PART OF LUNG: Primary | ICD-10-CM

## 2021-01-20 LAB
ALBUMIN SERPL-MCNC: 4.3 G/DL (ref 3.5–5.2)
ALBUMIN/GLOB SERPL: 1.7 G/DL
ALP SERPL-CCNC: 83 U/L (ref 39–117)
ALT SERPL W P-5'-P-CCNC: 23 U/L (ref 1–33)
ANION GAP SERPL CALCULATED.3IONS-SCNC: 9 MMOL/L (ref 5–15)
AST SERPL-CCNC: 20 U/L (ref 1–32)
BASOPHILS # BLD AUTO: 0.06 10*3/MM3 (ref 0–0.2)
BASOPHILS NFR BLD AUTO: 0.6 % (ref 0–1.5)
BILIRUB SERPL-MCNC: 0.2 MG/DL (ref 0–1.2)
BUN SERPL-MCNC: 14 MG/DL (ref 6–20)
BUN/CREAT SERPL: 21.5 (ref 7–25)
CALCIUM SPEC-SCNC: 8.6 MG/DL (ref 8.6–10.5)
CHLORIDE SERPL-SCNC: 104 MMOL/L (ref 98–107)
CO2 SERPL-SCNC: 28 MMOL/L (ref 22–29)
CREAT SERPL-MCNC: 0.65 MG/DL (ref 0.57–1)
DEPRECATED RDW RBC AUTO: 43.1 FL (ref 37–54)
EOSINOPHIL # BLD AUTO: 0.1 10*3/MM3 (ref 0–0.4)
EOSINOPHIL NFR BLD AUTO: 1 % (ref 0.3–6.2)
ERYTHROCYTE [DISTWIDTH] IN BLOOD BY AUTOMATED COUNT: 12.4 % (ref 12.3–15.4)
FLUAV RNA RESP QL NAA+PROBE: NOT DETECTED
FLUBV RNA RESP QL NAA+PROBE: NOT DETECTED
GFR SERPL CREATININE-BSD FRML MDRD: 101 ML/MIN/1.73
GLOBULIN UR ELPH-MCNC: 2.5 GM/DL
GLUCOSE SERPL-MCNC: 92 MG/DL (ref 65–99)
HCT VFR BLD AUTO: 40.3 % (ref 34–46.6)
HGB BLD-MCNC: 12.8 G/DL (ref 12–15.9)
HOLD SPECIMEN: NORMAL
HOLD SPECIMEN: NORMAL
IMM GRANULOCYTES # BLD AUTO: 0.03 10*3/MM3 (ref 0–0.05)
IMM GRANULOCYTES NFR BLD AUTO: 0.3 % (ref 0–0.5)
LYMPHOCYTES # BLD AUTO: 2.61 10*3/MM3 (ref 0.7–3.1)
LYMPHOCYTES NFR BLD AUTO: 25.8 % (ref 19.6–45.3)
MCH RBC QN AUTO: 30.3 PG (ref 26.6–33)
MCHC RBC AUTO-ENTMCNC: 31.8 G/DL (ref 31.5–35.7)
MCV RBC AUTO: 95.3 FL (ref 79–97)
MONOCYTES # BLD AUTO: 0.65 10*3/MM3 (ref 0.1–0.9)
MONOCYTES NFR BLD AUTO: 6.4 % (ref 5–12)
NEUTROPHILS NFR BLD AUTO: 6.68 10*3/MM3 (ref 1.7–7)
NEUTROPHILS NFR BLD AUTO: 65.9 % (ref 42.7–76)
NRBC BLD AUTO-RTO: 0 /100 WBC (ref 0–0.2)
NT-PROBNP SERPL-MCNC: 30.8 PG/ML (ref 0–450)
PLATELET # BLD AUTO: 330 10*3/MM3 (ref 140–450)
PMV BLD AUTO: 9.4 FL (ref 6–12)
POTASSIUM SERPL-SCNC: 3.3 MMOL/L (ref 3.5–5.2)
PROT SERPL-MCNC: 6.8 G/DL (ref 6–8.5)
RBC # BLD AUTO: 4.23 10*6/MM3 (ref 3.77–5.28)
SARS-COV-2 RNA RESP QL NAA+PROBE: NOT DETECTED
SODIUM SERPL-SCNC: 141 MMOL/L (ref 136–145)
TROPONIN T SERPL-MCNC: <0.01 NG/ML (ref 0–0.03)
WBC # BLD AUTO: 10.13 10*3/MM3 (ref 3.4–10.8)
WHOLE BLOOD HOLD SPECIMEN: NORMAL
WHOLE BLOOD HOLD SPECIMEN: NORMAL

## 2021-01-20 PROCEDURE — 99283 EMERGENCY DEPT VISIT LOW MDM: CPT

## 2021-01-20 PROCEDURE — 71045 X-RAY EXAM CHEST 1 VIEW: CPT

## 2021-01-20 PROCEDURE — 93005 ELECTROCARDIOGRAM TRACING: CPT

## 2021-01-20 PROCEDURE — 85025 COMPLETE CBC W/AUTO DIFF WBC: CPT

## 2021-01-20 PROCEDURE — 80053 COMPREHEN METABOLIC PANEL: CPT | Performed by: EMERGENCY MEDICINE

## 2021-01-20 PROCEDURE — 93005 ELECTROCARDIOGRAM TRACING: CPT | Performed by: EMERGENCY MEDICINE

## 2021-01-20 PROCEDURE — 84484 ASSAY OF TROPONIN QUANT: CPT | Performed by: EMERGENCY MEDICINE

## 2021-01-20 PROCEDURE — 87636 SARSCOV2 & INF A&B AMP PRB: CPT | Performed by: NURSE PRACTITIONER

## 2021-01-20 PROCEDURE — 83880 ASSAY OF NATRIURETIC PEPTIDE: CPT | Performed by: EMERGENCY MEDICINE

## 2021-01-20 PROCEDURE — C9803 HOPD COVID-19 SPEC COLLECT: HCPCS | Performed by: NURSE PRACTITIONER

## 2021-01-20 RX ORDER — SODIUM CHLORIDE 0.9 % (FLUSH) 0.9 %
10 SYRINGE (ML) INJECTION AS NEEDED
Status: DISCONTINUED | OUTPATIENT
Start: 2021-01-20 | End: 2021-01-20 | Stop reason: HOSPADM

## 2021-01-21 NOTE — ED PROVIDER NOTES
Subjective   Patient presents the ER for cough and difficulty breathing for several days. She denies fever but does have a sore throat and has been coughing so much that she urinates on herself.      Cough  Cough characteristics:  Non-productive  Sputum characteristics:  Nondescript  Associated symptoms: shortness of breath and sore throat    Associated symptoms: no chest pain, no chills, no diaphoresis, no fever and no wheezing        Review of Systems   Constitutional: Negative for chills, diaphoresis and fever.   HENT: Positive for congestion and sore throat.    Respiratory: Positive for cough and shortness of breath. Negative for choking, chest tightness and wheezing.    Cardiovascular: Negative for chest pain and leg swelling.   Gastrointestinal: Negative for abdominal distention, abdominal pain, anal bleeding, blood in stool, constipation, diarrhea, nausea and vomiting.   Genitourinary: Negative for difficulty urinating, dysuria, flank pain, frequency, hematuria and urgency.   All other systems reviewed and are negative.      Past Medical History:   Diagnosis Date   •  in first trimester     ELECTIVE   • Drug use    • Hepatitis B    • Hepatitis B antibody positive    • Hepatitis C    • Hepatitis C antibody positive in blood        No Known Allergies    Past Surgical History:   Procedure Laterality Date   • BREAST AUGMENTATION Bilateral    • BREAST BIOPSY     •  SECTION N/A 2017    Procedure:  SECTION PRIMARY;  Surgeon: Miguelito Powell MD;  Location: [facility] LABOR DELIVERY;  Service:    • INDUCED       ELECTIVE   • WISDOM TOOTH EXTRACTION         Family History   Problem Relation Age of Onset   • Colon cancer Maternal Grandmother    • Breast cancer Mother    • Ovarian cancer Neg Hx    • Uterine cancer Neg Hx    • Endometrial cancer Neg Hx        Social History     Socioeconomic History   • Marital status:      Spouse name: Not on file   • Number of  children: Not on file   • Years of education: Not on file   • Highest education level: Not on file   Tobacco Use   • Smoking status: Current Every Day Smoker     Packs/day: 0.50     Years: 20.00     Pack years: 10.00     Types: Cigarettes   • Smokeless tobacco: Never Used   Substance and Sexual Activity   • Alcohol use: No   • Drug use: Not Currently     Types: Benzodiazepines, Methamphetamines     Comment: in 2015- SOBER SINCE 2016   • Sexual activity: Not Currently     Partners: Male     Birth control/protection: None           Objective   Physical Exam    Procedures           ED Course  ED Course as of Jan 27 2207 Wed Jan 20, 2021   1702 Patient eloped.  I never evaluated this patient or performed a physical exam    [JM]   1736 COVID PRE-OP / PRE-PROCEDURE SCREENING ORDER (NO ISOLATION) - Swab, Nasopharynx [EDDIE]   2278 I called the pt and advised her of her XR. I will call in zmax to the pharm. She wound the number [phone]    [EDDIE]   5233 I called and spoke with the patient. She gave her the number to her pharmacy. She tells me she is in a hurry and that is why she left. I did advise her of her negative Covid test the findings on her chest x-ray and the concern for pneumonia. I never evaluated this patient in person I only spoke to her over the phone. I will call and Zithromax to the phone number she gave me at [phone]. She is well aware the signs of worse condition.    [EDDIE]      ED Course User Index  [EDDIE] Charlie Medrano APRN                                           [facility]    Final diagnoses:   Pneumonia due to infectious organism, unspecified laterality, unspecified part of lung            Charlie Medrano APRN  01/20/21 1907       Charlie Medrano APRN  01/27/21 2207

## 2021-01-23 LAB
QT INTERVAL: 360 MS
QTC INTERVAL: 430 MS

## 2021-02-06 ENCOUNTER — HOSPITAL ENCOUNTER (EMERGENCY)
Facility: HOSPITAL | Age: 41
Discharge: LEFT BEFORE DISCHARGE | End: 2021-02-06
Attending: EMERGENCY MEDICINE | Admitting: EMERGENCY MEDICINE

## 2021-02-06 VITALS
BODY MASS INDEX: 26.66 KG/M2 | HEART RATE: 85 BPM | OXYGEN SATURATION: 99 % | SYSTOLIC BLOOD PRESSURE: 164 MMHG | WEIGHT: 160 LBS | RESPIRATION RATE: 18 BRPM | DIASTOLIC BLOOD PRESSURE: 82 MMHG | HEIGHT: 65 IN | TEMPERATURE: 97.3 F

## 2021-02-06 DIAGNOSIS — R06.00 DYSPNEA, UNSPECIFIED TYPE: Primary | ICD-10-CM

## 2021-02-06 DIAGNOSIS — Z20.822 EXPOSURE TO COVID-19 VIRUS: ICD-10-CM

## 2021-02-06 PROCEDURE — 99282 EMERGENCY DEPT VISIT SF MDM: CPT

## 2021-02-06 PROCEDURE — 99281 EMR DPT VST MAYX REQ PHY/QHP: CPT

## 2021-02-07 NOTE — ED PROVIDER NOTES
Subjective   Viviane Brambila is a 40 yr old female that presents to the emergency department for complaints of shortness of breath.  Patient advises for the past few weeks she has had pain across her back that increases with breathing.  She denies the pain increases with coughing and movement.  She denies fever but reports chills.  Patient has been exposed multiple times to COVID-19.  Patient has been diagnosed with pneumonia recently and been put on a Z-Garett.  Patient has taken the medication with minimal relief.  Patient is a smoker.  She denies any nausea, vomiting.      History provided by:  Patient   used: No    Shortness of Breath  Severity:  Mild  Timing:  Constant  Progression:  Worsening  Worsened by:  Deep breathing, coughing and movement  Associated symptoms: cough    Associated symptoms: no abdominal pain, no chest pain, no fever and no vomiting    Risk factors: tobacco use        Review of Systems   Constitutional: Negative for chills and fever.   Respiratory: Positive for cough and shortness of breath.    Cardiovascular: Negative for chest pain.   Gastrointestinal: Negative for abdominal pain, nausea and vomiting.   Neurological: Negative for dizziness and weakness.   All other systems reviewed and are negative.      Past Medical History:   Diagnosis Date   •  in first trimester     ELECTIVE   • Drug use    • Hepatitis B    • Hepatitis B antibody positive    • Hepatitis C    • Hepatitis C antibody positive in blood        No Known Allergies    Past Surgical History:   Procedure Laterality Date   • BREAST AUGMENTATION Bilateral    • BREAST BIOPSY     •  SECTION N/A 2017    Procedure:  SECTION PRIMARY;  Surgeon: Miguelito Powell MD;  Location: [facility] LABOR DELIVERY;  Service:    • INDUCED       ELECTIVE   • WISDOM TOOTH EXTRACTION         Family History   Problem Relation Age of Onset   • Colon cancer Maternal Grandmother    • Breast  cancer Mother    • Ovarian cancer Neg Hx    • Uterine cancer Neg Hx    • Endometrial cancer Neg Hx        Social History     Socioeconomic History   • Marital status:      Spouse name: Not on file   • Number of children: Not on file   • Years of education: Not on file   • Highest education level: Not on file   Tobacco Use   • Smoking status: Current Every Day Smoker     Packs/day: 0.50     Years: 20.00     Pack years: 10.00     Types: Cigarettes   • Smokeless tobacco: Never Used   Substance and Sexual Activity   • Alcohol use: No   • Drug use: Not Currently     Types: Benzodiazepines, Methamphetamines     Comment: in 2015- SOBER SINCE 2016   • Sexual activity: Not Currently     Partners: Male     Birth control/protection: None           Objective   Physical Exam  Vitals signs and nursing note reviewed.   Constitutional:       Appearance: Normal appearance. She is well-developed. She is not ill-appearing or toxic-appearing.      Comments: Patient is anxious   HENT:      Head: Normocephalic and atraumatic.      Nose: Nose normal.      Mouth/Throat:      Mouth: Mucous membranes are moist.   Eyes:      General: Lids are normal.      Conjunctiva/sclera: Conjunctivae normal.      Pupils: Pupils are equal, round, and reactive to light.   Neck:      Musculoskeletal: Full passive range of motion without pain and normal range of motion.      Trachea: Trachea normal.   Cardiovascular:      Rate and Rhythm: Regular rhythm.      Pulses: Normal pulses.      Heart sounds: Normal heart sounds.   Pulmonary:      Effort: Pulmonary effort is normal. No respiratory distress.      Breath sounds: Normal breath sounds. No decreased breath sounds, wheezing, rhonchi or rales.   Abdominal:      General: Bowel sounds are normal.      Palpations: Abdomen is soft.      Tenderness: There is no abdominal tenderness.   Musculoskeletal: Normal range of motion.   Skin:     General: Skin is warm and dry.      Findings: No rash.    Neurological:      Mental Status: She is alert and oriented to person, place, and time.      Cranial Nerves: No cranial nerve deficit.   Psychiatric:         Mood and Affect: Mood is anxious.         Speech: Speech normal.         Behavior: Behavior normal. Behavior is cooperative.         Procedures           ED Course  ED Course as of Feb 07 0123   Sun Feb 07, 2021   0000 I was notified per nursing staff that the patient had left AGAINST MEDICAL ADVICE.    [KG]      ED Course User Index  [KG] Emma Rene APRN                                           [facility]    Final diagnoses:   Dyspnea, unspecified type   Exposure to COVID-19 virus            Emma Rene APRN  02/07/21 0123

## 2021-09-29 ENCOUNTER — OFFICE VISIT (OUTPATIENT)
Dept: OBSTETRICS AND GYNECOLOGY | Facility: CLINIC | Age: 41
End: 2021-09-29

## 2021-09-29 VITALS
BODY MASS INDEX: 32.62 KG/M2 | SYSTOLIC BLOOD PRESSURE: 138 MMHG | WEIGHT: 196 LBS | RESPIRATION RATE: 14 BRPM | DIASTOLIC BLOOD PRESSURE: 84 MMHG

## 2021-09-29 DIAGNOSIS — N63.10 BREAST MASS, RIGHT: Primary | ICD-10-CM

## 2021-09-29 PROCEDURE — 99212 OFFICE O/P EST SF 10 MIN: CPT | Performed by: OBSTETRICS & GYNECOLOGY

## 2021-09-29 NOTE — PROGRESS NOTES
Subjective   Chief Complaint   Patient presents with   • Breast Mass     right breast     Viviane Brambila is a 40 y.o. year old .  Patient's last menstrual period was 09/15/2021 (approximate).  She presents to be seen because of lump of her right breast. Patient reports she first noticed it a couple of weeks ago. She reports it feels like the size of pea. She reports it is tender to touch. She reports redness around the area. Denies nipple discharge. Reports her mother and maternal aunts had breast cancer. Patient has a history of right breast mass. This is separate from that. She reports the other breast lump is still there. She never had the recommended follow up diagnostic mammogram at . She prefers to have any future imaging at [facility].    OTHER THINGS SHE WANTS TO DISCUSS TODAY:  Nothing else    The following portions of the patient's history were reviewed and updated as appropriate:current medications and allergies    Social History    Tobacco Use      Smoking status: Current Every Day Smoker        Packs/day: 0.50        Years: 20.00        Pack years: 10        Types: Cigarettes      Smokeless tobacco: Never Used    Review of Systems  Constitutional POS: nothing reported    NEG: anorexia or night sweats   Genitourinary POS: nothing reported    NEG: dysuria or hematuria   Gastointestinal POS: nothing reported    NEG: bloating, change in bowel habits, melena or reflux symptoms   Integument POS: nothing reported    NEG: moles that are changing in size, shape, color or rashes   Breast POS: see HPI    NEG: skin dimpling or nipple discharge         Objective   /84   Resp 14   Wt 88.9 kg (196 lb)   LMP 09/15/2021 (Approximate)   Breastfeeding No   BMI 32.62 kg/m²     General:  well developed; well nourished  no acute distress   Skin:  Not performed.   Thyroid: not examined   Lungs:  breathing is unlabored   Heart:  Not performed.   Breasts:  Examined in supine position  Nipples normal without  inversion, lesions or discharge  There are no palpable axillary nodes  There is an ~  1 cm mass is present in the right breast at 12 o'clock.  It is 5.5 cm from the outer edge of the areola.  It is freely mobile. There is an ~ 1.5 cm mass is present in the right breast at 11 o'clock.  It is 3 cm from the outer edge of the areola.  It is freely mobile.   Abdomen: soft, non-tender; no masses  no umbilical or inguinal hernias are present  no hepato-splenomegaly   Pelvis: Not performed.     Lab Review   No data reviewed    Imaging   Mammogram report -- 2/2020       Assessment   1. Right Breast Masses     Plan   1. Will order diagnostic mammogram for above.  2. Discussed my departure from [facility]. Will transition care to one of my partners.  3. The importance of keeping all planned follow-up and taking all medications as prescribed was emphasized.  4. Follow up for annual exam in 2-3 months with Dr. Green.     No orders of the defined types were placed in this encounter.         This note was electronically signed.    Denice Chang DO  September 29, 2021    Note: Speech recognition transcription software may have been used to create portions of this document.  An attempt at proofreading has been made but errors in transcription could still be present.

## 2021-12-15 ENCOUNTER — TELEPHONE (OUTPATIENT)
Dept: OBSTETRICS AND GYNECOLOGY | Facility: CLINIC | Age: 41
End: 2021-12-15

## 2022-01-30 PROBLEM — Z01.419 WELL WOMAN EXAM: Status: ACTIVE | Noted: 2022-01-30

## 2022-01-30 PROBLEM — Z72.0 TOBACCO ABUSE: Status: RESOLVED | Noted: 2017-12-17 | Resolved: 2022-01-30

## 2022-01-30 PROBLEM — J22 ACUTE RESPIRATORY INFECTION: Status: RESOLVED | Noted: 2017-12-17 | Resolved: 2022-01-30

## 2022-02-07 PROBLEM — O09.521 MULTIGRAVIDA OF ADVANCED MATERNAL AGE IN FIRST TRIMESTER: Status: ACTIVE | Noted: 2022-02-07

## 2022-02-07 PROBLEM — O34.219 HISTORY OF CESAREAN DELIVERY AFFECTING PREGNANCY: Status: ACTIVE | Noted: 2022-02-07

## 2022-02-07 PROBLEM — O09.91 HIGH-RISK PREGNANCY IN FIRST TRIMESTER: Status: ACTIVE | Noted: 2022-02-07

## 2022-02-14 PROBLEM — O09.529 MATERNAL AGE > 35, MULTIGRAVIDA: Status: ACTIVE | Noted: 2022-02-07

## 2022-02-15 PROBLEM — O09.90 HIGH-RISK PREGNANCY: Status: ACTIVE | Noted: 2022-02-07

## 2022-02-15 PROBLEM — O44.32: Status: ACTIVE | Noted: 2022-02-15

## 2023-01-17 ENCOUNTER — TELEPHONE (OUTPATIENT)
Dept: OBSTETRICS AND GYNECOLOGY | Facility: CLINIC | Age: 43
End: 2023-01-17

## 2023-01-17 NOTE — TELEPHONE ENCOUNTER
Caller: Viviane Brambila    Relationship to patient: Self    Best call back number: [phone]    Patient is needing: PT CALLED TO R/S SAME DAY UNABLE TO MAKE IT. R/S TO 1-30

## 2023-05-30 ENCOUNTER — APPOINTMENT (OUTPATIENT)
Dept: GENERAL RADIOLOGY | Facility: HOSPITAL | Age: 43
End: 2023-05-30
Payer: COMMERCIAL

## 2023-05-30 ENCOUNTER — HOSPITAL ENCOUNTER (EMERGENCY)
Facility: HOSPITAL | Age: 43
Discharge: LEFT BEFORE DISCHARGE | End: 2023-05-30
Attending: EMERGENCY MEDICINE | Admitting: EMERGENCY MEDICINE
Payer: COMMERCIAL

## 2023-05-30 VITALS
SYSTOLIC BLOOD PRESSURE: 131 MMHG | OXYGEN SATURATION: 98 % | WEIGHT: 175 LBS | HEIGHT: 66 IN | DIASTOLIC BLOOD PRESSURE: 77 MMHG | BODY MASS INDEX: 28.12 KG/M2 | HEART RATE: 90 BPM | RESPIRATION RATE: 16 BRPM | TEMPERATURE: 98.3 F

## 2023-05-30 PROCEDURE — 99211 OFF/OP EST MAY X REQ PHY/QHP: CPT | Performed by: EMERGENCY MEDICINE

## 2023-11-10 ENCOUNTER — APPOINTMENT (OUTPATIENT)
Dept: GENERAL RADIOLOGY | Facility: HOSPITAL | Age: 43
End: 2023-11-10
Payer: COMMERCIAL

## 2023-11-10 ENCOUNTER — HOSPITAL ENCOUNTER (EMERGENCY)
Facility: HOSPITAL | Age: 43
Discharge: HOME | End: 2023-11-10
Attending: EMERGENCY MEDICINE
Payer: COMMERCIAL

## 2023-11-10 VITALS
WEIGHT: 178 LBS | DIASTOLIC BLOOD PRESSURE: 76 MMHG | BODY MASS INDEX: 29.66 KG/M2 | HEART RATE: 72 BPM | SYSTOLIC BLOOD PRESSURE: 136 MMHG | RESPIRATION RATE: 18 BRPM | TEMPERATURE: 98 F | HEIGHT: 65 IN | OXYGEN SATURATION: 94 %

## 2023-11-10 DIAGNOSIS — M51.36 DDD (DEGENERATIVE DISC DISEASE), LUMBAR: Primary | ICD-10-CM

## 2023-11-10 PROCEDURE — 96372 THER/PROPH/DIAG INJ SC/IM: CPT

## 2023-11-10 PROCEDURE — 25010000002 KETOROLAC TROMETHAMINE PER 15 MG: Performed by: EMERGENCY MEDICINE

## 2023-11-10 PROCEDURE — 72110 X-RAY EXAM L-2 SPINE 4/>VWS: CPT

## 2023-11-10 PROCEDURE — 99283 EMERGENCY DEPT VISIT LOW MDM: CPT

## 2023-11-10 RX ORDER — METHOCARBAMOL 750 MG/1
750 TABLET, FILM COATED ORAL ONCE
Status: COMPLETED | OUTPATIENT
Start: 2023-11-10 | End: 2023-11-10

## 2023-11-10 RX ORDER — KETOROLAC TROMETHAMINE 30 MG/ML
30 INJECTION, SOLUTION INTRAMUSCULAR; INTRAVENOUS ONCE
Status: COMPLETED | OUTPATIENT
Start: 2023-11-10 | End: 2023-11-10

## 2023-11-10 RX ORDER — METHOCARBAMOL 750 MG/1
750 TABLET, FILM COATED ORAL 3 TIMES DAILY
Qty: 21 TABLET | Refills: 0 | Status: SHIPPED | OUTPATIENT
Start: 2023-11-10 | End: 2023-11-17

## 2023-11-10 RX ADMIN — KETOROLAC TROMETHAMINE 30 MG: 30 INJECTION, SOLUTION INTRAMUSCULAR; INTRAVENOUS at 22:18

## 2023-11-10 RX ADMIN — METHOCARBAMOL 750 MG: 750 TABLET ORAL at 22:18

## 2023-11-11 NOTE — ED PROVIDER NOTES
Subjective   History of Present Illness  Is a 42-year-old female with past medical history of drug abuse and hepatitis presented to the emergency department with some chronic low back pain.  Patient states that she has had some lower back pain for the last 3 years or so.  States that it flares up from time to time.  She states that it is worse when she is standing.  She has been trying to work, however when she stands on her feet she gets some dull pain in her back.  Today is located in her right lower lateral side.  Dull in nature.  Nonradiating.  Patient states that she has been seen numerous times had multiple rounds of steroids, muscle relaxers as well as Tylenol.  She denies any fevers or chills.  No incontinence.  No headache or change in vision.  No focal weakness.  Chest pain or shortness of breath.  No abdominal pain or vomiting.    History provided by:  Patient   used: No        Review of Systems   Constitutional:  Negative for chills and fever.   HENT:  Negative for congestion, ear pain and sore throat.    Eyes:  Negative for visual disturbance.   Respiratory:  Negative for shortness of breath.    Cardiovascular:  Negative for chest pain.   Gastrointestinal:  Negative for abdominal pain.   Genitourinary:  Negative for difficulty urinating.   Musculoskeletal:  Positive for back pain. Negative for arthralgias.   Skin:  Negative for rash.   Neurological:  Negative for dizziness, weakness and numbness.   Psychiatric/Behavioral:  Negative for agitation.        Past Medical History:   Diagnosis Date    Drug use     Hepatitis B antibody positive     Hepatitis C antibody positive in blood        No Known Allergies    Past Surgical History:   Procedure Laterality Date    BREAST AUGMENTATION Bilateral 2004    BREAST BIOPSY       SECTION N/A 2017    Procedure:  SECTION PRIMARY;  Surgeon: Miguelito Powell MD;  Location: [facility] LABOR DELIVERY;  Service:     INDUCED    2012    ELECTIVE    WISDOM TOOTH EXTRACTION         Family History   Problem Relation Age of Onset    Colon cancer Maternal Grandmother     Breast cancer Mother     Ovarian cancer Neg Hx     Uterine cancer Neg Hx     Endometrial cancer Neg Hx        Social History     Socioeconomic History    Marital status:    Tobacco Use    Smoking status: Every Day     Packs/day: 0.50     Years: 20.00     Additional pack years: 0.00     Total pack years: 10.00     Types: Cigarettes    Smokeless tobacco: Never   Substance and Sexual Activity    Alcohol use: No    Drug use: Not Currently     Types: Benzodiazepines, Methamphetamines     Comment: in - SOBER SINCE     Sexual activity: Not Currently     Partners: Male     Birth control/protection: None           Objective   Physical Exam  Vitals and nursing note reviewed.   Constitutional:       General: She is not in acute distress.     Appearance: She is not ill-appearing or toxic-appearing.   HENT:      Mouth/Throat:      Pharynx: No posterior oropharyngeal erythema.   Eyes:      Conjunctiva/sclera: Conjunctivae normal.      Pupils: Pupils are equal, round, and reactive to light.   Cardiovascular:      Rate and Rhythm: Normal rate and regular rhythm.   Pulmonary:      Effort: Pulmonary effort is normal. No respiratory distress.   Abdominal:      General: Abdomen is flat. There is no distension.      Palpations: There is no mass.      Tenderness: There is no abdominal tenderness. There is no guarding or rebound.   Musculoskeletal:         General: No deformity. Normal range of motion.      Comments: Minimal tenderness in the right paraspinal lumbar region.  Is no step-off or deformity.  No saddle anesthesia   Skin:     General: Skin is warm.      Findings: No rash.   Neurological:      General: No focal deficit present.      Mental Status: She is alert and oriented to person, place, and time.      Motor: No weakness.         Procedures           ED  Course  ED Course as of 11/10/23 2306   Fri Nov 10, 2023   2136 BP: 132/81 [JK]   2136 Temp: 98 °F (36.7 °C) [JK]   2136 Temp src: Oral [JK]   2136 Heart Rate: 94 [JK]   2136 Resp: 18 [JK]   2136 SpO2: 99 %  Patient's repeat vitals, telemetry tracing, and pulse oximetry tracing were directly viewed and interpreted by myself.  Patient hemodynamically stable [JK]   2304 XR Spine Lumbar Complete 4+VW  Imaging was directly visualized by myself, per my interpretations, x-ray of the lumbar spine showed degenerative disc disease. [JK]   2304 The patient is currently afebrile.  They do not have any midline spinal tenderness.  There are no focal neurologic deficits.  Patient is not an IV drug abuser.  No pulsatile mass.  No urinary retention or incontinence.  No saddle anesthesia.  No loss of sensation.  Due to these findings, there is low risk for epidural abscess, AAA or other significant spinal cord pathology.    On reevaluation, the patient's pain is improved.  The patient is able to ambulate without difficulty.  Neurologic examination is completely unchanged.  [JK]   2304 I had a discussion with the patient/family regarding diagnosis, diagnostic results, treatment plan, and medications. The patient/family indicated understanding of these instructions. I spent adequate time at the bedside prior to discharge necessary to discuss the aftercare instructions, giving patient education, providing explanations of the results of our evaluations/findings, and my decision making to assure that the patient/family understand the plan of care. Time was allotted to answer questions at that time and throughout the ED course. Patient is required to maintain timely follow up, as discussed. I also discussed the potential for the development of an acute emergent condition requiring further evaluation, return to the ER, admission, or even surgical intervention.  I encouraged the patient to return to the emergency department immediately for  any concerns, worsening symptoms, new complaints, or if symptoms persist and they are unable to seek follow-up in a timely fashion. The patient/family expressed understanding and agreement with this plan    Shared decision making:   After full review of the patient's clinical presentation, review of any work-up including but not limited to laboratory studies and radiology obtained, I had a discussion with the patient.  Treatment options were discussed as well as the risks, benefits and consequences.  I discussed all findings with the patient and family members if available.  During the discussion, treatment goals were understood by all as well as any misconceptions which were addressed with the patient.  Ample time was given for any questions they may have had.  They are in agreement with the treatment plan as well as final disposition. [JK]      ED Course User Index  [JK] Lencho Pemberton MD                                           Medical Decision Making  This is a 42-year-old female with history of hepatitis presenting to the emergency department with some chronic low back pain.  Patient's symptoms seem most consistent with a lumbar strain or acute exacerbation of her chronic pain.  Overall she has no evidence of step-off or deformity.  There is no spinal cord compression.  She is afebrile.  Patient be given symptomatic relief.  Imaging obtained.      Differential diagnosis: Lumbar strain, contusion, spasm, acute on chronic pain      Amount and/or Complexity of Data Reviewed  External Data Reviewed: labs, radiology and notes.     Details: External laboratories, imaging as well as notes were reviewed personally by myself.  All relevant studies were used to guide decision making.     Date of previous record: 11/8/2023    Source of note: Primary care physician    Summary: Patient was seen for routine visit.  Did review laboratory studies on file as well as previous imaging of her lumbar spine.  Records  reviewed    Radiology: ordered and independent interpretation performed. Decision-making details documented in ED Course.    Risk  Prescription drug management.        Final diagnoses:   DDD (degenerative disc disease), lumbar       ED Disposition  ED Disposition       ED Disposition   Discharge    Condition   Stable    Comment   --               [facility]  [address]  [facility] [id number] [phone]  Call in 1 day      Dion Keating MD  [address]  [place] [id number]  [phone]    Call in 1 day           Medication List        New Prescriptions      methocarbamol 750 MG tablet  Commonly known as: ROBAXIN  Take 1 tablet by mouth 3 (Three) Times a Day for 7 days.               Where to Get Your Medications        These medications were sent to [id number] - [place], [place] - [address] AT [facility] - [phone]  - [phone]   [address] [facility] [address]      Phone: [phone]   methocarbamol 750 MG tablet            Lencho Pemberton MD  11/10/23 1031

## 2025-05-30 ENCOUNTER — TELEPHONE (OUTPATIENT)
Dept: OBSTETRICS AND GYNECOLOGY | Facility: CLINIC | Age: 45
End: 2025-05-30

## (undated) DEVICE — SUT MNCRYL 3/0 27L Y523H BX/36

## (undated) DEVICE — SUT MONOCRYL PLS ANTIB UND 3/0  PS1 27IN

## (undated) DEVICE — SUT MONOCRYL 0 MO4 CR8 27IN YY71G ETYY71G

## (undated) DEVICE — PK C/SECT 10

## (undated) DEVICE — SOL IRR NACL 0.9PCT BT 1000ML

## (undated) DEVICE — SKIN AFFIX SURG ADHESIVE 72/CS 0.55ML: Brand: MEDLINE

## (undated) DEVICE — SUT PLAIN  3/0 CT1 27IN 842H

## (undated) DEVICE — GLV SURG BIOGEL LTX PF 8 1/2

## (undated) DEVICE — SUT PDS 0 CTX 36IN DYED Z370T

## (undated) DEVICE — 39" SINGLE PATIENT USE HOVERMATT: Brand: SINGLE PATIENT USE HOVERMATT

## (undated) DEVICE — SOL IRR H2O BTL 1000ML STRL

## (undated) DEVICE — SUT GUT CHRM 2/0 CT1 27IN 811H

## (undated) DEVICE — TRY SPINE BLCK WHITACRE 25G 3X5IN